# Patient Record
Sex: MALE | Race: BLACK OR AFRICAN AMERICAN | NOT HISPANIC OR LATINO
[De-identification: names, ages, dates, MRNs, and addresses within clinical notes are randomized per-mention and may not be internally consistent; named-entity substitution may affect disease eponyms.]

---

## 2017-02-06 PROBLEM — Z00.00 ENCOUNTER FOR PREVENTIVE HEALTH EXAMINATION: Status: ACTIVE | Noted: 2017-02-06

## 2017-02-07 PROBLEM — Z86.79 HISTORY OF CARDIAC MURMUR: Status: RESOLVED | Noted: 2017-02-07 | Resolved: 2017-02-07

## 2017-02-07 PROBLEM — Z86.79 HISTORY OF HYPERTENSION: Status: RESOLVED | Noted: 2017-02-07 | Resolved: 2017-02-07

## 2017-02-08 ENCOUNTER — APPOINTMENT (OUTPATIENT)
Dept: CARDIOTHORACIC SURGERY | Facility: CLINIC | Age: 72
End: 2017-02-08

## 2017-02-08 VITALS
HEART RATE: 55 BPM | BODY MASS INDEX: 32.15 KG/M2 | TEMPERATURE: 97.9 F | OXYGEN SATURATION: 96 % | SYSTOLIC BLOOD PRESSURE: 146 MMHG | RESPIRATION RATE: 16 BRPM | WEIGHT: 193 LBS | DIASTOLIC BLOOD PRESSURE: 72 MMHG | HEIGHT: 65 IN

## 2017-02-08 DIAGNOSIS — Z86.79 PERSONAL HISTORY OF OTHER DISEASES OF THE CIRCULATORY SYSTEM: ICD-10-CM

## 2017-02-08 DIAGNOSIS — Z83.3 FAMILY HISTORY OF DIABETES MELLITUS: ICD-10-CM

## 2017-02-08 DIAGNOSIS — I35.9 NONRHEUMATIC AORTIC VALVE DISORDER, UNSPECIFIED: ICD-10-CM

## 2017-02-08 DIAGNOSIS — R06.09 OTHER FORMS OF DYSPNEA: ICD-10-CM

## 2017-02-08 DIAGNOSIS — I35.1 NONRHEUMATIC AORTIC (VALVE) INSUFFICIENCY: ICD-10-CM

## 2017-02-08 DIAGNOSIS — I48.91 UNSPECIFIED ATRIAL FIBRILLATION: ICD-10-CM

## 2017-02-08 DIAGNOSIS — I35.0 NONRHEUMATIC AORTIC (VALVE) STENOSIS: ICD-10-CM

## 2017-02-08 DIAGNOSIS — Z86.69 PERSONAL HISTORY OF OTHER DISEASES OF THE NERVOUS SYSTEM AND SENSE ORGANS: ICD-10-CM

## 2017-02-08 DIAGNOSIS — Z78.9 OTHER SPECIFIED HEALTH STATUS: ICD-10-CM

## 2017-02-08 DIAGNOSIS — I50.30 UNSPECIFIED DIASTOLIC (CONGESTIVE) HEART FAILURE: ICD-10-CM

## 2017-02-08 DIAGNOSIS — Z86.39 PERSONAL HISTORY OF OTHER ENDOCRINE, NUTRITIONAL AND METABOLIC DISEASE: ICD-10-CM

## 2017-02-10 PROBLEM — Z86.69 HISTORY OF GLAUCOMA: Status: RESOLVED | Noted: 2017-02-10 | Resolved: 2017-02-10

## 2017-02-10 PROBLEM — I50.30 DIASTOLIC CONGESTIVE HEART FAILURE: Status: ACTIVE | Noted: 2017-02-10

## 2017-02-10 PROBLEM — Z78.9 PREFERRED LANGUAGE IS CREOLE: Status: ACTIVE | Noted: 2017-02-10

## 2017-02-10 PROBLEM — Z86.39 HISTORY OF HYPERLIPIDEMIA: Status: RESOLVED | Noted: 2017-02-10 | Resolved: 2017-02-10

## 2017-02-10 PROBLEM — Z83.3 FAMILY HISTORY OF DIABETES MELLITUS: Status: ACTIVE | Noted: 2017-02-10

## 2017-02-10 PROBLEM — R06.09 DYSPNEA ON EXERTION: Status: ACTIVE | Noted: 2017-02-10

## 2017-02-10 PROBLEM — I35.1 SEVERE AORTIC REGURGITATION: Status: ACTIVE | Noted: 2017-02-07

## 2017-02-10 RX ORDER — AMLODIPINE BESYLATE AND BENAZEPRIL HYDROCHLORIDE 10; 40 MG/1; MG/1
10-40 CAPSULE ORAL
Qty: 90 | Refills: 0 | Status: ACTIVE | COMMUNITY
Start: 2016-01-05

## 2017-02-10 RX ORDER — BRIMONIDINE TARTRATE 2 MG/MG
0.2 SOLUTION/ DROPS OPHTHALMIC
Qty: 5 | Refills: 0 | Status: ACTIVE | COMMUNITY
Start: 2016-10-21

## 2017-02-10 RX ORDER — ASPIRIN ENTERIC COATED TABLETS 81 MG 81 MG/1
81 TABLET, DELAYED RELEASE ORAL DAILY
Qty: 30 | Refills: 3 | Status: ACTIVE | COMMUNITY
Start: 2017-02-10

## 2017-02-10 RX ORDER — LATANOPROST/PF 0.005 %
0.01 DROPS OPHTHALMIC (EYE)
Qty: 2 | Refills: 0 | Status: ACTIVE | COMMUNITY
Start: 2016-10-21

## 2017-02-10 RX ORDER — EPLERENONE 25 MG/1
25 TABLET, COATED ORAL
Qty: 90 | Refills: 0 | Status: DISCONTINUED | COMMUNITY
Start: 2016-01-29 | End: 2017-02-10

## 2017-02-10 RX ORDER — DORZOLAMIDE HYDROCHLORIDE TIMOLOL MALEATE 20; 5 MG/ML; MG/ML
22.3-6.8 SOLUTION/ DROPS OPHTHALMIC
Qty: 10 | Refills: 0 | Status: ACTIVE | COMMUNITY
Start: 2016-10-21

## 2017-02-10 RX ORDER — LEVOFLOXACIN 500 MG/1
500 TABLET, FILM COATED ORAL
Qty: 10 | Refills: 0 | Status: DISCONTINUED | COMMUNITY
Start: 2017-01-10 | End: 2017-02-10

## 2017-02-13 VITALS
DIASTOLIC BLOOD PRESSURE: 67 MMHG | OXYGEN SATURATION: 98 % | HEART RATE: 53 BPM | WEIGHT: 191.8 LBS | SYSTOLIC BLOOD PRESSURE: 140 MMHG | TEMPERATURE: 98 F | HEIGHT: 64.96 IN | RESPIRATION RATE: 16 BRPM

## 2017-02-13 RX ORDER — CHLORHEXIDINE GLUCONATE 213 G/1000ML
1 SOLUTION TOPICAL ONCE
Qty: 0 | Refills: 0 | Status: DISCONTINUED | OUTPATIENT
Start: 2017-02-15 | End: 2017-02-15

## 2017-02-13 NOTE — H&P ADULT. - ASSESSMENT
72 yr old Creole speaking M with PMHx of HTN, who presented to cardiologist with CCS Angina Class III equivalent symptoms, symptomatic severe AS, who now presents for recommended right and left heart catheterization prior to aortic valve surgery with Dr. Jose scheduled for 2/23/17.     Pt is bradycardic in the 50's today and was previously bradycardic per outpt records 01/30/2017. Currently asymptomatic, denies dizziness, fainting, lightheadedness. Will continue to monitor.    ASA 3, Mallampati 3

## 2017-02-13 NOTE — H&P ADULT. - HISTORY OF PRESENT ILLNESS
**SKELETON    72 yr old Creole speaking M with PMHx of HTN who presented to cardiologist c/o progressively worsening CHRISTIAN        Echocardiogram on 6/14/16 revealed normal LV size and systolic function, EF:65-70%. Mild LVH. Severe AS, severe AR, moderate MR. PASP 25-30mmHg.     In light of risk factors, CCS Angina Class III equivalent symptoms, known AS, patient now presents for recommended right and left heart catheterization. **History obtained via daughter- Heather Waddell (reliable)*  **will bring in medications, please confirm**    72 yr old Creole speaking M with PMHx of HTN, who presented to cardiologist c/o progressively worsening CHRISTIAN x 1 year. Patient states he can barely walk >1 flight of stairs prior to becoming winded and developing palpitations. Patient recently has also been experiencing nonradiating midsternal chest tightness with exertion (eg: climbing a flight of stairs, lifting heavy objects), 5/10 in severity, relieved within a few minutes of rest. Patient further reports bilateral LE pitting edema. Patient denies any dizziness, syncope, PND or orthopnea. Prior Echocardiogram on 6/14/16 revealed normal LV size and systolic function, EF:65-70%. Mild LVH. Severe AS, severe AR, moderate MR. PASP 25-30mmHg. In light of risk factors, CCS Angina Class III equivalent symptoms, known AS, patient now presents for recommended right and left heart catheterization prior to aortic valve surgery with Dr. Jose scheduled for 2/23/17. **History obtained via daughter- Heather Waddell (reliable)*    72 yr old Creole speaking M with PMHx of HTN, who presented to cardiologist c/o progressively worsening CHRISTIAN x 1 year. Patient states he can barely walk >1 flight of stairs prior to becoming winded and developing palpitations. Patient recently has also been experiencing nonradiating midsternal chest tightness with exertion (eg: climbing a flight of stairs, lifting heavy objects), 5/10 in severity, relieved within a few minutes of rest. Patient further reports bilateral LE pitting edema. Patient denies any dizziness, syncope, PND or orthopnea. Prior Echocardiogram on 6/14/16 revealed normal LV size and systolic function, EF:65-70%. Mild LVH. Severe AS, severe AR, moderate MR. PASP 25-30mmHg. In light of risk factors, CCS Angina Class III equivalent symptoms, known AS, patient now presents for recommended right and left heart catheterization prior to aortic valve surgery with Dr. Jose scheduled for 2/23/17.

## 2017-02-14 ENCOUNTER — FORM ENCOUNTER (OUTPATIENT)
Age: 72
End: 2017-02-14

## 2017-02-15 ENCOUNTER — OUTPATIENT (OUTPATIENT)
Dept: OUTPATIENT SERVICES | Facility: HOSPITAL | Age: 72
LOS: 1 days | Discharge: NURSING, REHAB OR LONG-TERM CARE | End: 2017-02-15
Payer: COMMERCIAL

## 2017-02-15 ENCOUNTER — OUTPATIENT (OUTPATIENT)
Dept: OUTPATIENT SERVICES | Facility: HOSPITAL | Age: 72
LOS: 1 days | End: 2017-02-15
Payer: COMMERCIAL

## 2017-02-15 DIAGNOSIS — I10 ESSENTIAL (PRIMARY) HYPERTENSION: ICD-10-CM

## 2017-02-15 DIAGNOSIS — Z98.890 OTHER SPECIFIED POSTPROCEDURAL STATES: Chronic | ICD-10-CM

## 2017-02-15 DIAGNOSIS — R07.9 CHEST PAIN, UNSPECIFIED: ICD-10-CM

## 2017-02-15 DIAGNOSIS — I34.0 NONRHEUMATIC MITRAL (VALVE) INSUFFICIENCY: ICD-10-CM

## 2017-02-15 DIAGNOSIS — E78.5 HYPERLIPIDEMIA, UNSPECIFIED: ICD-10-CM

## 2017-02-15 LAB
ALBUMIN SERPL ELPH-MCNC: 3.7 G/DL — SIGNIFICANT CHANGE UP (ref 3.4–5)
ALP SERPL-CCNC: 97 U/L — SIGNIFICANT CHANGE UP (ref 40–120)
ALT FLD-CCNC: 28 U/L — SIGNIFICANT CHANGE UP (ref 12–42)
ANION GAP SERPL CALC-SCNC: 7 MMOL/L — LOW (ref 9–16)
APTT BLD: 34.5 SEC — SIGNIFICANT CHANGE UP (ref 27.5–37.4)
AST SERPL-CCNC: 26 U/L — SIGNIFICANT CHANGE UP (ref 15–37)
BASOPHILS NFR BLD AUTO: 0.2 % — SIGNIFICANT CHANGE UP (ref 0–2)
BILIRUB SERPL-MCNC: 0.5 MG/DL — SIGNIFICANT CHANGE UP (ref 0.2–1.2)
BLD GP AB SCN SERPL QL: NEGATIVE — SIGNIFICANT CHANGE UP
BUN SERPL-MCNC: 10 MG/DL — SIGNIFICANT CHANGE UP (ref 7–23)
CALCIUM SERPL-MCNC: 8.7 MG/DL — SIGNIFICANT CHANGE UP (ref 8.5–10.5)
CHLORIDE SERPL-SCNC: 108 MMOL/L — SIGNIFICANT CHANGE UP (ref 96–108)
CHOLEST SERPL-MCNC: 163 MG/DL — SIGNIFICANT CHANGE UP
CO2 SERPL-SCNC: 28 MMOL/L — SIGNIFICANT CHANGE UP (ref 22–31)
CREAT SERPL-MCNC: 0.66 MG/DL — SIGNIFICANT CHANGE UP (ref 0.5–1.3)
EOSINOPHIL NFR BLD AUTO: 3.6 % — SIGNIFICANT CHANGE UP (ref 0–6)
GLUCOSE SERPL-MCNC: 81 MG/DL — SIGNIFICANT CHANGE UP (ref 70–99)
HBA1C BLD-MCNC: 4.9 % — SIGNIFICANT CHANGE UP (ref 4.8–5.6)
HBV SURFACE AG SER-ACNC: SIGNIFICANT CHANGE UP
HCT VFR BLD CALC: 34.2 % — LOW (ref 39–50)
HDLC SERPL-MCNC: 62 MG/DL — SIGNIFICANT CHANGE UP
HGB BLD-MCNC: 11.2 G/DL — LOW (ref 13–17)
INR BLD: 1.13 — SIGNIFICANT CHANGE UP (ref 0.88–1.16)
LIPID PNL WITH DIRECT LDL SERPL: 91 MG/DL — SIGNIFICANT CHANGE UP
LYMPHOCYTES # BLD AUTO: 54.2 % — HIGH (ref 13–44)
MCHC RBC-ENTMCNC: 24.3 PG — LOW (ref 27–34)
MCHC RBC-ENTMCNC: 32.7 G/DL — SIGNIFICANT CHANGE UP (ref 32–36)
MCV RBC AUTO: 74.2 FL — LOW (ref 80–100)
MONOCYTES NFR BLD AUTO: 3.9 % — SIGNIFICANT CHANGE UP (ref 2–14)
NEUTROPHILS NFR BLD AUTO: 38.1 % — LOW (ref 43–77)
PLATELET # BLD AUTO: 170 K/UL — SIGNIFICANT CHANGE UP (ref 150–400)
POTASSIUM SERPL-MCNC: 3.9 MMOL/L — SIGNIFICANT CHANGE UP (ref 3.5–5.3)
POTASSIUM SERPL-SCNC: 3.9 MMOL/L — SIGNIFICANT CHANGE UP (ref 3.5–5.3)
PROT SERPL-MCNC: 7.9 G/DL — SIGNIFICANT CHANGE UP (ref 6.4–8.2)
PROTHROM AB SERPL-ACNC: 12.6 SEC — SIGNIFICANT CHANGE UP (ref 10–13.1)
RBC # BLD: 4.61 M/UL — SIGNIFICANT CHANGE UP (ref 4.2–5.8)
RBC # FLD: 14.1 % — SIGNIFICANT CHANGE UP (ref 10.3–16.9)
RH IG SCN BLD-IMP: POSITIVE — SIGNIFICANT CHANGE UP
SODIUM SERPL-SCNC: 143 MMOL/L — SIGNIFICANT CHANGE UP (ref 135–145)
TOTAL CHOLESTEROL/HDL RATIO MEASUREMENT: 2.6 RATIO — SIGNIFICANT CHANGE UP
TRIGL SERPL-MCNC: 51 MG/DL — SIGNIFICANT CHANGE UP
TSH SERPL-MCNC: 0.69 UIU/ML — SIGNIFICANT CHANGE UP (ref 0.35–4.94)
WBC # BLD: 4.2 K/UL — SIGNIFICANT CHANGE UP (ref 3.8–10.5)
WBC # FLD AUTO: 4.2 K/UL — SIGNIFICANT CHANGE UP (ref 3.8–10.5)

## 2017-02-15 PROCEDURE — 86901 BLOOD TYPING SEROLOGIC RH(D): CPT

## 2017-02-15 PROCEDURE — C1889: CPT

## 2017-02-15 PROCEDURE — C1769: CPT

## 2017-02-15 PROCEDURE — 87340 HEPATITIS B SURFACE AG IA: CPT

## 2017-02-15 PROCEDURE — 83036 HEMOGLOBIN GLYCOSYLATED A1C: CPT

## 2017-02-15 PROCEDURE — 84443 ASSAY THYROID STIM HORMONE: CPT

## 2017-02-15 PROCEDURE — C1887: CPT

## 2017-02-15 PROCEDURE — 86900 BLOOD TYPING SEROLOGIC ABO: CPT

## 2017-02-15 PROCEDURE — C1894: CPT

## 2017-02-15 PROCEDURE — 80061 LIPID PANEL: CPT

## 2017-02-15 PROCEDURE — 81001 URINALYSIS AUTO W/SCOPE: CPT

## 2017-02-15 PROCEDURE — 93456 R HRT CORONARY ARTERY ANGIO: CPT | Mod: 26

## 2017-02-15 PROCEDURE — 71046 X-RAY EXAM CHEST 2 VIEWS: CPT

## 2017-02-15 PROCEDURE — 81003 URINALYSIS AUTO W/O SCOPE: CPT

## 2017-02-15 PROCEDURE — 71020: CPT | Mod: 26

## 2017-02-15 PROCEDURE — 80053 COMPREHEN METABOLIC PANEL: CPT

## 2017-02-15 PROCEDURE — 85610 PROTHROMBIN TIME: CPT

## 2017-02-15 PROCEDURE — 93456 R HRT CORONARY ARTERY ANGIO: CPT

## 2017-02-15 PROCEDURE — 85730 THROMBOPLASTIN TIME PARTIAL: CPT

## 2017-02-15 PROCEDURE — 93306 TTE W/DOPPLER COMPLETE: CPT

## 2017-02-15 PROCEDURE — 86850 RBC ANTIBODY SCREEN: CPT

## 2017-02-15 PROCEDURE — 85025 COMPLETE CBC W/AUTO DIFF WBC: CPT

## 2017-02-15 RX ORDER — SODIUM CHLORIDE 9 MG/ML
500 INJECTION, SOLUTION INTRAVENOUS
Qty: 0 | Refills: 0 | Status: DISCONTINUED | OUTPATIENT
Start: 2017-02-15 | End: 2017-02-15

## 2017-02-21 VITALS
TEMPERATURE: 98 F | RESPIRATION RATE: 16 BRPM | SYSTOLIC BLOOD PRESSURE: 140 MMHG | HEART RATE: 53 BPM | OXYGEN SATURATION: 98 % | WEIGHT: 192.9 LBS | HEIGHT: 65 IN | DIASTOLIC BLOOD PRESSURE: 67 MMHG

## 2017-02-21 NOTE — H&P ADULT. - HISTORY OF PRESENT ILLNESS
[id number] 72 year old, Creole speaking, with a past medical history of hypertension and hyperlipidemia presents for evaluation and management of valvular disease.     Echocardiogram 6/14/16 revealed: EF 65-70%. LVDD 5.27cm. Severe aortic stenosis. Severe aortic regurgitation. Moderate mitral regurgitation.     Patient reports worsening dyspnea on exertion associated with "chest cramps" with ambulating 3-4 blocks and up one flights of stairs. He denies dizziness, syncope, palpitations, orthopnea or PND. +peripheral edema.     Cardiac catheterization 2/15/17 revealed normal coronaries. Repeat echocardiogram revealed: the left ventricular ejection fraction is estimated to be 65-70%. There is moderate to severe aortic regurgitation. There is severe aortic stenosis. The mean pressure gradient is 52 mmHg. The calculated aortic valve area using the continuity equation is 0.8 cm2. Structurally normal mitral valve. There is trace mitral regurgitation.    The patient was evaluated by CT surgery and is deemed a surgical candidate for aortic valve replacement. The patient agrees to proceed with surgery. [id number]

## 2017-02-21 NOTE — H&P ADULT. - PMH
Aortic regurgitation    Aortic stenosis    Congestive heart failure    Glaucoma    High cholesterol    HTN (hypertension)

## 2017-02-21 NOTE — H&P ADULT. - ASSESSMENT
CHF, AS, AR.     Dr. Jose has reviewed all the patient's medical records and diagnostic studies during his consultation.  He has had a lengthy discussion with the patient regarding his valvular disease and progression.  He has recommended that the patient is a candidate for an aortic valve replacement. He has reviewed all risks, benefits, and alternatives of surgery with the patient including but not limited to stoke, bleeding, infection and death. He has quoted the patient a 1-2% surgical risk.  The patient agrees to proceed with surgery 2/23/17. All preoperative instructions including antibacterial showers x3 reviewed with the patient.

## 2017-02-21 NOTE — H&P ADULT. - FAMILY HISTORY
Mother  Still living? Unknown  Diabetes mellitus, Age at diagnosis: Age Unknown     Sibling  Still living? Unknown  Diabetes mellitus, Age at diagnosis: Age Unknown

## 2017-02-21 NOTE — H&P ADULT. - OTHER
Echocardiogram     There is severe concentric left ventricular hypertrophy. The left ventricular wall motion is normal. The left ventricular ejection fraction is estimated to be 65-70%. The mitral inflow pattern is consistent with impaired left ventricular relaxation with mildly elevated left atrial pressure (8-14mmHg). The left atrial size is normal. Right atrial size is normal. The right ventricle is normal in size and function. There is severe aortic valve thickening. There is moderate to severe aortic regurgitation. There is Severe aortic stenosis. The mean pressure gradient is 52 mmHg. The calculated aortic valve area using the continuity equation is 0.8 cm2. Structurally normal mitral valve. There is trace mitral regurgitation. Structurally normal tricuspid valve. There was insufficient TR detected from which to calculate pulmonary artery systolic pressure. Structurally normal pulmonic valve. No pulmonic regurgitation noted. No aortic root dilatation. The inferior vena cava is normal in size (<2.1 cm) with normal inspiratory collapse (>50%) consistent with normal right atrial pressure. There is no pericardial effusion.

## 2017-02-23 ENCOUNTER — OUTPATIENT (OUTPATIENT)
Dept: OUTPATIENT SERVICES | Facility: HOSPITAL | Age: 72
LOS: 1 days | End: 2017-02-23
Payer: COMMERCIAL

## 2017-02-23 ENCOUNTER — RESULT REVIEW (OUTPATIENT)
Age: 72
End: 2017-02-23

## 2017-02-23 DIAGNOSIS — I35.0 NONRHEUMATIC AORTIC (VALVE) STENOSIS: ICD-10-CM

## 2017-02-23 DIAGNOSIS — Z98.890 OTHER SPECIFIED POSTPROCEDURAL STATES: Chronic | ICD-10-CM

## 2017-02-23 PROCEDURE — 94010 BREATHING CAPACITY TEST: CPT | Mod: 26

## 2017-02-23 PROCEDURE — 94729 DIFFUSING CAPACITY: CPT | Mod: 26

## 2017-02-23 PROCEDURE — 94726 PLETHYSMOGRAPHY LUNG VOLUMES: CPT | Mod: 26

## 2017-02-24 ENCOUNTER — INPATIENT (INPATIENT)
Facility: HOSPITAL | Age: 72
LOS: 7 days | Discharge: HOME HEALTH | DRG: 220 | End: 2017-03-04
Attending: THORACIC SURGERY (CARDIOTHORACIC VASCULAR SURGERY) | Admitting: THORACIC SURGERY (CARDIOTHORACIC VASCULAR SURGERY)
Payer: COMMERCIAL

## 2017-02-24 ENCOUNTER — APPOINTMENT (OUTPATIENT)
Dept: CARDIOTHORACIC SURGERY | Facility: HOSPITAL | Age: 72
End: 2017-02-24

## 2017-02-24 DIAGNOSIS — I35.0 NONRHEUMATIC AORTIC (VALVE) STENOSIS: ICD-10-CM

## 2017-02-24 DIAGNOSIS — Z41.9 ENCOUNTER FOR PROCEDURE FOR PURPOSES OTHER THAN REMEDYING HEALTH STATE, UNSPECIFIED: Chronic | ICD-10-CM

## 2017-02-24 DIAGNOSIS — Z98.890 OTHER SPECIFIED POSTPROCEDURAL STATES: Chronic | ICD-10-CM

## 2017-02-24 LAB
ALBUMIN SERPL ELPH-MCNC: 3.4 G/DL — SIGNIFICANT CHANGE UP (ref 3.4–5)
ALP SERPL-CCNC: 60 U/L — SIGNIFICANT CHANGE UP (ref 40–120)
ALT FLD-CCNC: 16 U/L — SIGNIFICANT CHANGE UP (ref 12–42)
ANION GAP SERPL CALC-SCNC: 5 MMOL/L — LOW (ref 9–16)
ANION GAP SERPL CALC-SCNC: 6 MMOL/L — LOW (ref 9–16)
ANION GAP SERPL CALC-SCNC: 7 MMOL/L — LOW (ref 9–16)
ANION GAP SERPL CALC-SCNC: 8 MMOL/L — LOW (ref 9–16)
APTT BLD: 32.6 SEC — SIGNIFICANT CHANGE UP (ref 27.5–37.4)
APTT BLD: 34.8 SEC — SIGNIFICANT CHANGE UP (ref 27.5–37.4)
APTT BLD: 38 SEC — HIGH (ref 27.5–37.4)
AST SERPL-CCNC: 24 U/L — SIGNIFICANT CHANGE UP (ref 15–37)
BASE EXCESS BLDA CALC-SCNC: -0.3 MMOL/L — SIGNIFICANT CHANGE UP (ref -2–3)
BASE EXCESS BLDA CALC-SCNC: -0.6 MMOL/L — SIGNIFICANT CHANGE UP (ref -2–3)
BASE EXCESS BLDA CALC-SCNC: -0.8 MMOL/L — SIGNIFICANT CHANGE UP (ref -2–3)
BASE EXCESS BLDA CALC-SCNC: -1.3 MMOL/L — SIGNIFICANT CHANGE UP (ref -2–3)
BASE EXCESS BLDA CALC-SCNC: 0.3 MMOL/L — SIGNIFICANT CHANGE UP (ref -2–3)
BASE EXCESS BLDA CALC-SCNC: 0.9 MMOL/L — SIGNIFICANT CHANGE UP (ref -2–3)
BILIRUB SERPL-MCNC: 0.9 MG/DL — SIGNIFICANT CHANGE UP (ref 0.2–1.2)
BUN SERPL-MCNC: 10 MG/DL — SIGNIFICANT CHANGE UP (ref 7–23)
BUN SERPL-MCNC: 10 MG/DL — SIGNIFICANT CHANGE UP (ref 7–23)
BUN SERPL-MCNC: 13 MG/DL — SIGNIFICANT CHANGE UP (ref 7–23)
BUN SERPL-MCNC: 9 MG/DL — SIGNIFICANT CHANGE UP (ref 7–23)
CA-I BLDA-SCNC: 1.16 MMOL/L — SIGNIFICANT CHANGE UP (ref 1.1–1.3)
CALCIUM SERPL-MCNC: 7.4 MG/DL — LOW (ref 8.5–10.5)
CALCIUM SERPL-MCNC: 7.9 MG/DL — LOW (ref 8.5–10.5)
CALCIUM SERPL-MCNC: 8.3 MG/DL — LOW (ref 8.5–10.5)
CALCIUM SERPL-MCNC: 8.6 MG/DL — SIGNIFICANT CHANGE UP (ref 8.5–10.5)
CHLORIDE SERPL-SCNC: 104 MMOL/L — SIGNIFICANT CHANGE UP (ref 96–108)
CHLORIDE SERPL-SCNC: 107 MMOL/L — SIGNIFICANT CHANGE UP (ref 96–108)
CHLORIDE SERPL-SCNC: 109 MMOL/L — HIGH (ref 96–108)
CHLORIDE SERPL-SCNC: 110 MMOL/L — HIGH (ref 96–108)
CO2 SERPL-SCNC: 26 MMOL/L — SIGNIFICANT CHANGE UP (ref 22–31)
CO2 SERPL-SCNC: 26 MMOL/L — SIGNIFICANT CHANGE UP (ref 22–31)
CO2 SERPL-SCNC: 27 MMOL/L — SIGNIFICANT CHANGE UP (ref 22–31)
CO2 SERPL-SCNC: 28 MMOL/L — SIGNIFICANT CHANGE UP (ref 22–31)
COHGB MFR BLDA: 2.1 % — HIGH
CREAT SERPL-MCNC: 0.66 MG/DL — SIGNIFICANT CHANGE UP (ref 0.5–1.3)
CREAT SERPL-MCNC: 0.76 MG/DL — SIGNIFICANT CHANGE UP (ref 0.5–1.3)
CREAT SERPL-MCNC: 0.8 MG/DL — SIGNIFICANT CHANGE UP (ref 0.5–1.3)
CREAT SERPL-MCNC: 0.8 MG/DL — SIGNIFICANT CHANGE UP (ref 0.5–1.3)
GAS PNL BLDA: SIGNIFICANT CHANGE UP
GLUCOSE SERPL-MCNC: 142 MG/DL — HIGH (ref 70–99)
GLUCOSE SERPL-MCNC: 155 MG/DL — HIGH (ref 70–99)
GLUCOSE SERPL-MCNC: 157 MG/DL — HIGH (ref 70–99)
GLUCOSE SERPL-MCNC: 99 MG/DL — SIGNIFICANT CHANGE UP (ref 70–99)
HCO3 BLDA-SCNC: 22 MMOL/L — SIGNIFICANT CHANGE UP (ref 21–28)
HCO3 BLDA-SCNC: 24 MMOL/L — SIGNIFICANT CHANGE UP (ref 21–28)
HCO3 BLDA-SCNC: 25 MMOL/L — SIGNIFICANT CHANGE UP (ref 21–28)
HCO3 BLDA-SCNC: 26 MMOL/L — SIGNIFICANT CHANGE UP (ref 21–28)
HCO3 BLDA-SCNC: 26 MMOL/L — SIGNIFICANT CHANGE UP (ref 21–28)
HCO3 BLDA-SCNC: 27 MMOL/L — SIGNIFICANT CHANGE UP (ref 21–28)
HCT VFR BLD CALC: 26.4 % — LOW (ref 39–50)
HCT VFR BLD CALC: 26.5 % — LOW (ref 39–50)
HCT VFR BLD CALC: 27.2 % — LOW (ref 39–50)
HCT VFR BLD CALC: 29.1 % — LOW (ref 39–50)
HGB BLD-MCNC: 8.3 G/DL — LOW (ref 13–17)
HGB BLD-MCNC: 8.6 G/DL — LOW (ref 13–17)
HGB BLD-MCNC: 9 G/DL — LOW (ref 13–17)
HGB BLD-MCNC: 9.6 G/DL — LOW (ref 13–17)
HGB BLDA-MCNC: 11 G/DL — LOW (ref 13–17)
INR BLD: 1.28 — HIGH (ref 0.88–1.16)
INR BLD: 1.33 — HIGH (ref 0.88–1.16)
INR BLD: 1.38 — HIGH (ref 0.88–1.16)
LACTATE SERPL-SCNC: 1.5 MMOL/L — SIGNIFICANT CHANGE UP (ref 0.5–2)
LACTATE SERPL-SCNC: 1.9 MMOL/L — SIGNIFICANT CHANGE UP (ref 0.5–2)
LACTATE SERPL-SCNC: 2.1 MMOL/L — HIGH (ref 0.5–2)
LACTATE SERPL-SCNC: 2.8 MMOL/L — HIGH (ref 0.5–2)
LYMPHOCYTES # BLD AUTO: 22 % — SIGNIFICANT CHANGE UP (ref 13–44)
MAGNESIUM SERPL-MCNC: 2.3 MG/DL — SIGNIFICANT CHANGE UP (ref 1.6–2.4)
MAGNESIUM SERPL-MCNC: 2.3 MG/DL — SIGNIFICANT CHANGE UP (ref 1.6–2.4)
MAGNESIUM SERPL-MCNC: 2.8 MG/DL — HIGH (ref 1.6–2.4)
MCHC RBC-ENTMCNC: 23.6 PG — LOW (ref 27–34)
MCHC RBC-ENTMCNC: 24 PG — LOW (ref 27–34)
MCHC RBC-ENTMCNC: 24.4 PG — LOW (ref 27–34)
MCHC RBC-ENTMCNC: 24.7 PG — LOW (ref 27–34)
MCHC RBC-ENTMCNC: 31.4 G/DL — LOW (ref 32–36)
MCHC RBC-ENTMCNC: 32.5 G/DL — SIGNIFICANT CHANGE UP (ref 32–36)
MCHC RBC-ENTMCNC: 33 G/DL — SIGNIFICANT CHANGE UP (ref 32–36)
MCHC RBC-ENTMCNC: 33.1 G/DL — SIGNIFICANT CHANGE UP (ref 32–36)
MCV RBC AUTO: 73.9 FL — LOW (ref 80–100)
MCV RBC AUTO: 74 FL — LOW (ref 80–100)
MCV RBC AUTO: 74.5 FL — LOW (ref 80–100)
MCV RBC AUTO: 75 FL — LOW (ref 80–100)
METHGB MFR BLDA: 0.3 % — SIGNIFICANT CHANGE UP
MONOCYTES NFR BLD AUTO: 3 % — SIGNIFICANT CHANGE UP (ref 2–14)
NEUTROPHILS NFR BLD AUTO: 66 % — SIGNIFICANT CHANGE UP (ref 43–77)
O2 CT VFR BLDA CALC: 16.2 ML/DL — SIGNIFICANT CHANGE UP (ref 15–23)
OXYHGB MFR BLDA: 97 % — SIGNIFICANT CHANGE UP (ref 94–100)
PCO2 BLDA: 31 MMHG — LOW (ref 35–48)
PCO2 BLDA: 38 MMHG — SIGNIFICANT CHANGE UP (ref 35–48)
PCO2 BLDA: 44 MMHG — SIGNIFICANT CHANGE UP (ref 35–48)
PCO2 BLDA: 45 MMHG — SIGNIFICANT CHANGE UP (ref 35–48)
PCO2 BLDA: 48 MMHG — SIGNIFICANT CHANGE UP (ref 35–48)
PCO2 BLDA: 52 MMHG — HIGH (ref 35–48)
PH BLDA: 7.32 — LOW (ref 7.35–7.45)
PH BLDA: 7.35 — SIGNIFICANT CHANGE UP (ref 7.35–7.45)
PH BLDA: 7.36 — SIGNIFICANT CHANGE UP (ref 7.35–7.45)
PH BLDA: 7.39 — SIGNIFICANT CHANGE UP (ref 7.35–7.45)
PH BLDA: 7.41 — SIGNIFICANT CHANGE UP (ref 7.35–7.45)
PH BLDA: 7.48 — HIGH (ref 7.35–7.45)
PHOSPHATE SERPL-MCNC: 3.1 MG/DL — SIGNIFICANT CHANGE UP (ref 2.5–4.5)
PHOSPHATE SERPL-MCNC: 4.2 MG/DL — SIGNIFICANT CHANGE UP (ref 2.5–4.5)
PLATELET # BLD AUTO: 102 K/UL — LOW (ref 150–400)
PLATELET # BLD AUTO: 104 K/UL — LOW (ref 150–400)
PLATELET # BLD AUTO: 107 K/UL — LOW (ref 150–400)
PLATELET # BLD AUTO: 112 K/UL — LOW (ref 150–400)
PO2 BLDA: 164 MMHG — HIGH (ref 83–108)
PO2 BLDA: 165 MMHG — HIGH (ref 83–108)
PO2 BLDA: 165 MMHG — HIGH (ref 83–108)
PO2 BLDA: 167 MMHG — HIGH (ref 83–108)
PO2 BLDA: 343 MMHG — HIGH (ref 83–108)
PO2 BLDA: 423 MMHG — HIGH (ref 83–108)
POTASSIUM BLDA-SCNC: 3.8 MMOL/L — SIGNIFICANT CHANGE UP (ref 3.5–4.9)
POTASSIUM SERPL-MCNC: 3.6 MMOL/L — SIGNIFICANT CHANGE UP (ref 3.5–5.3)
POTASSIUM SERPL-MCNC: 4.1 MMOL/L — SIGNIFICANT CHANGE UP (ref 3.5–5.3)
POTASSIUM SERPL-MCNC: 4.2 MMOL/L — SIGNIFICANT CHANGE UP (ref 3.5–5.3)
POTASSIUM SERPL-MCNC: 4.2 MMOL/L — SIGNIFICANT CHANGE UP (ref 3.5–5.3)
POTASSIUM SERPL-SCNC: 3.6 MMOL/L — SIGNIFICANT CHANGE UP (ref 3.5–5.3)
POTASSIUM SERPL-SCNC: 4.1 MMOL/L — SIGNIFICANT CHANGE UP (ref 3.5–5.3)
POTASSIUM SERPL-SCNC: 4.2 MMOL/L — SIGNIFICANT CHANGE UP (ref 3.5–5.3)
POTASSIUM SERPL-SCNC: 4.2 MMOL/L — SIGNIFICANT CHANGE UP (ref 3.5–5.3)
PROT SERPL-MCNC: 6 G/DL — LOW (ref 6.4–8.2)
PROTHROM AB SERPL-ACNC: 14.2 SEC — HIGH (ref 10–13.1)
PROTHROM AB SERPL-ACNC: 14.8 SEC — HIGH (ref 10–13.1)
PROTHROM AB SERPL-ACNC: 15.4 SEC — HIGH (ref 10–13.1)
RBC # BLD: 3.52 M/UL — LOW (ref 4.2–5.8)
RBC # BLD: 3.58 M/UL — LOW (ref 4.2–5.8)
RBC # BLD: 3.65 M/UL — LOW (ref 4.2–5.8)
RBC # BLD: 3.94 M/UL — LOW (ref 4.2–5.8)
RBC # FLD: 13.7 % — SIGNIFICANT CHANGE UP (ref 10.3–16.9)
RBC # FLD: 13.8 % — SIGNIFICANT CHANGE UP (ref 10.3–16.9)
RBC # FLD: 14.2 % — SIGNIFICANT CHANGE UP (ref 10.3–16.9)
RBC # FLD: 14.2 % — SIGNIFICANT CHANGE UP (ref 10.3–16.9)
SAO2 % BLDA: 100 % — SIGNIFICANT CHANGE UP (ref 95–100)
SAO2 % BLDA: 100 % — SIGNIFICANT CHANGE UP (ref 95–100)
SAO2 % BLDA: 99 % — SIGNIFICANT CHANGE UP (ref 95–100)
SODIUM BLDA-SCNC: 138 MMOL/L — SIGNIFICANT CHANGE UP (ref 138–146)
SODIUM SERPL-SCNC: 138 MMOL/L — SIGNIFICANT CHANGE UP (ref 135–145)
SODIUM SERPL-SCNC: 140 MMOL/L — SIGNIFICANT CHANGE UP (ref 135–145)
SODIUM SERPL-SCNC: 142 MMOL/L — SIGNIFICANT CHANGE UP (ref 135–145)
SODIUM SERPL-SCNC: 143 MMOL/L — SIGNIFICANT CHANGE UP (ref 135–145)
WBC # BLD: 13.3 K/UL — HIGH (ref 3.8–10.5)
WBC # BLD: 14.5 K/UL — HIGH (ref 3.8–10.5)
WBC # BLD: 8.5 K/UL — SIGNIFICANT CHANGE UP (ref 3.8–10.5)
WBC # BLD: 9.8 K/UL — SIGNIFICANT CHANGE UP (ref 3.8–10.5)
WBC # FLD AUTO: 13.3 K/UL — HIGH (ref 3.8–10.5)
WBC # FLD AUTO: 14.5 K/UL — HIGH (ref 3.8–10.5)
WBC # FLD AUTO: 8.5 K/UL — SIGNIFICANT CHANGE UP (ref 3.8–10.5)
WBC # FLD AUTO: 9.8 K/UL — SIGNIFICANT CHANGE UP (ref 3.8–10.5)

## 2017-02-24 PROCEDURE — 94726 PLETHYSMOGRAPHY LUNG VOLUMES: CPT

## 2017-02-24 PROCEDURE — 94060 EVALUATION OF WHEEZING: CPT

## 2017-02-24 PROCEDURE — 94729 DIFFUSING CAPACITY: CPT

## 2017-02-24 PROCEDURE — 99291 CRITICAL CARE FIRST HOUR: CPT

## 2017-02-24 PROCEDURE — C1889: CPT

## 2017-02-24 PROCEDURE — 33405 REPLACEMENT AORTIC VALVE OPN: CPT | Mod: AS

## 2017-02-24 PROCEDURE — 94760 N-INVAS EAR/PLS OXIMETRY 1: CPT

## 2017-02-24 PROCEDURE — 99292 CRITICAL CARE ADDL 30 MIN: CPT

## 2017-02-24 PROCEDURE — 71010: CPT | Mod: 26

## 2017-02-24 PROCEDURE — 93010 ELECTROCARDIOGRAM REPORT: CPT

## 2017-02-24 RX ORDER — ASPIRIN/CALCIUM CARB/MAGNESIUM 324 MG
1 TABLET ORAL
Qty: 0 | Refills: 0 | COMMUNITY

## 2017-02-24 RX ORDER — ALBUMIN HUMAN 25 %
250 VIAL (ML) INTRAVENOUS ONCE
Qty: 0 | Refills: 0 | Status: COMPLETED | OUTPATIENT
Start: 2017-02-24 | End: 2017-02-24

## 2017-02-24 RX ORDER — MEPERIDINE HYDROCHLORIDE 50 MG/ML
12.5 INJECTION INTRAMUSCULAR; INTRAVENOUS; SUBCUTANEOUS ONCE
Qty: 0 | Refills: 0 | Status: DISCONTINUED | OUTPATIENT
Start: 2017-02-24 | End: 2017-02-24

## 2017-02-24 RX ORDER — POTASSIUM CHLORIDE 20 MEQ
20 PACKET (EA) ORAL ONCE
Qty: 0 | Refills: 0 | Status: COMPLETED | OUTPATIENT
Start: 2017-02-24 | End: 2017-02-24

## 2017-02-24 RX ORDER — BRIMONIDINE TARTRATE 2 MG/MG
1 SOLUTION/ DROPS OPHTHALMIC
Qty: 0 | Refills: 0 | COMMUNITY

## 2017-02-24 RX ORDER — CLEVIDIPINE BUTYRATE 50MG/100ML
2 VIAL (ML) INTRAVENOUS
Qty: 25 | Refills: 0 | Status: DISCONTINUED | OUTPATIENT
Start: 2017-02-24 | End: 2017-02-25

## 2017-02-24 RX ORDER — ACETAMINOPHEN 500 MG
1000 TABLET ORAL ONCE
Qty: 0 | Refills: 0 | Status: COMPLETED | OUTPATIENT
Start: 2017-02-24 | End: 2017-02-24

## 2017-02-24 RX ORDER — INSULIN HUMAN 100 [IU]/ML
1 INJECTION, SOLUTION SUBCUTANEOUS
Qty: 250 | Refills: 0 | Status: DISCONTINUED | OUTPATIENT
Start: 2017-02-24 | End: 2017-02-25

## 2017-02-24 RX ORDER — CEFAZOLIN SODIUM 1 G
1000 VIAL (EA) INJECTION EVERY 8 HOURS
Qty: 0 | Refills: 0 | Status: COMPLETED | OUTPATIENT
Start: 2017-02-24 | End: 2017-02-26

## 2017-02-24 RX ORDER — PHENYLEPHRINE HYDROCHLORIDE 10 MG/ML
0.2 INJECTION INTRAVENOUS
Qty: 80 | Refills: 0 | Status: DISCONTINUED | OUTPATIENT
Start: 2017-02-24 | End: 2017-02-25

## 2017-02-24 RX ORDER — CEFAZOLIN SODIUM 1 G
1000 VIAL (EA) INJECTION EVERY 8 HOURS
Qty: 0 | Refills: 0 | Status: DISCONTINUED | OUTPATIENT
Start: 2017-02-24 | End: 2017-02-24

## 2017-02-24 RX ORDER — METOPROLOL TARTRATE 50 MG
2.5 TABLET ORAL ONCE
Qty: 0 | Refills: 0 | Status: COMPLETED | OUTPATIENT
Start: 2017-02-24 | End: 2017-02-24

## 2017-02-24 RX ORDER — FENTANYL CITRATE 50 UG/ML
12.5 INJECTION INTRAVENOUS ONCE
Qty: 0 | Refills: 0 | Status: DISCONTINUED | OUTPATIENT
Start: 2017-02-24 | End: 2017-02-24

## 2017-02-24 RX ORDER — FAMOTIDINE 10 MG/ML
20 INJECTION INTRAVENOUS DAILY
Qty: 0 | Refills: 0 | Status: DISCONTINUED | OUTPATIENT
Start: 2017-02-24 | End: 2017-02-25

## 2017-02-24 RX ORDER — LATANOPROST 0.05 MG/ML
1 SOLUTION/ DROPS OPHTHALMIC; TOPICAL
Qty: 0 | Refills: 0 | COMMUNITY

## 2017-02-24 RX ORDER — DORZOLAMIDE HYDROCHLORIDE TIMOLOL MALEATE 20; 5 MG/ML; MG/ML
1 SOLUTION/ DROPS OPHTHALMIC
Qty: 0 | Refills: 0 | COMMUNITY

## 2017-02-24 RX ORDER — PROPOFOL 10 MG/ML
5 INJECTION, EMULSION INTRAVENOUS
Qty: 1000 | Refills: 0 | Status: DISCONTINUED | OUTPATIENT
Start: 2017-02-24 | End: 2017-02-25

## 2017-02-24 RX ORDER — KETOROLAC TROMETHAMINE 30 MG/ML
30 SYRINGE (ML) INJECTION ONCE
Qty: 0 | Refills: 0 | Status: DISCONTINUED | OUTPATIENT
Start: 2017-02-24 | End: 2017-02-25

## 2017-02-24 RX ORDER — ONDANSETRON 8 MG/1
4 TABLET, FILM COATED ORAL ONCE
Qty: 0 | Refills: 0 | Status: COMPLETED | OUTPATIENT
Start: 2017-02-24 | End: 2017-02-24

## 2017-02-24 RX ORDER — SODIUM CHLORIDE 9 MG/ML
1000 INJECTION, SOLUTION INTRAVENOUS
Qty: 0 | Refills: 0 | Status: DISCONTINUED | OUTPATIENT
Start: 2017-02-24 | End: 2017-03-01

## 2017-02-24 RX ORDER — EPLERENONE 50 MG/1
1 TABLET, FILM COATED ORAL
Qty: 0 | Refills: 0 | COMMUNITY

## 2017-02-24 RX ORDER — CHLORHEXIDINE GLUCONATE 213 G/1000ML
5 SOLUTION TOPICAL EVERY 4 HOURS
Qty: 0 | Refills: 0 | Status: DISCONTINUED | OUTPATIENT
Start: 2017-02-24 | End: 2017-02-25

## 2017-02-24 RX ADMIN — ONDANSETRON 4 MILLIGRAM(S): 8 TABLET, FILM COATED ORAL at 18:49

## 2017-02-24 RX ADMIN — MEPERIDINE HYDROCHLORIDE 12.5 MILLIGRAM(S): 50 INJECTION INTRAMUSCULAR; INTRAVENOUS; SUBCUTANEOUS at 15:30

## 2017-02-24 RX ADMIN — Medication 100 MILLIEQUIVALENT(S): at 18:53

## 2017-02-24 RX ADMIN — SODIUM CHLORIDE 10 MILLILITER(S): 9 INJECTION, SOLUTION INTRAVENOUS at 20:08

## 2017-02-24 RX ADMIN — Medication 4 MG/HR: at 16:16

## 2017-02-24 RX ADMIN — Medication 125 MILLILITER(S): at 18:52

## 2017-02-24 RX ADMIN — INSULIN HUMAN 1 UNIT(S)/HR: 100 INJECTION, SOLUTION SUBCUTANEOUS at 20:05

## 2017-02-24 RX ADMIN — PROPOFOL 2.62 MICROGRAM(S)/KG/MIN: 10 INJECTION, EMULSION INTRAVENOUS at 14:20

## 2017-02-24 RX ADMIN — FENTANYL CITRATE 12.5 MICROGRAM(S): 50 INJECTION INTRAVENOUS at 17:30

## 2017-02-24 RX ADMIN — Medication 125 MILLILITER(S): at 20:03

## 2017-02-24 RX ADMIN — FAMOTIDINE 20 MILLIGRAM(S): 10 INJECTION INTRAVENOUS at 18:58

## 2017-02-24 RX ADMIN — Medication 100 MILLIGRAM(S): at 18:30

## 2017-02-24 RX ADMIN — Medication 400 MILLIGRAM(S): at 18:49

## 2017-02-24 RX ADMIN — Medication 1000 MILLIGRAM(S): at 18:53

## 2017-02-24 RX ADMIN — PROPOFOL 2.62 MICROGRAM(S)/KG/MIN: 10 INJECTION, EMULSION INTRAVENOUS at 14:30

## 2017-02-24 RX ADMIN — FENTANYL CITRATE 12.5 MICROGRAM(S): 50 INJECTION INTRAVENOUS at 16:55

## 2017-02-24 RX ADMIN — Medication 125 MILLILITER(S): at 18:51

## 2017-02-24 RX ADMIN — Medication 4 MG/HR: at 16:30

## 2017-02-24 RX ADMIN — MEPERIDINE HYDROCHLORIDE 12.5 MILLIGRAM(S): 50 INJECTION INTRAMUSCULAR; INTRAVENOUS; SUBCUTANEOUS at 16:45

## 2017-02-24 RX ADMIN — Medication 100 MILLIEQUIVALENT(S): at 18:00

## 2017-02-24 RX ADMIN — Medication 125 MILLILITER(S): at 18:50
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
## 2017-02-24 NOTE — PROGRESS NOTE ADULT - ASSESSMENT
73yo with severe AS/AI s/p mini- AVR.  uncomplicated case.  Arrived on low dose Epi, received FFP in the OR.  Pt sinus bradycardic but needed intermittent pacing.     Problems  1. S/p AVR  2. Post op resp failure  3. Hemodynamic instability  4. sinus bradycardia    Plan  Neuro - no deficit  CVS - s/p AVR, hyperdynamic LV.  Labile BP, fluid resuscitation  Sinus Clement with good BP.   change Epi for mathieu  Pulm - vent weaning  ID periop antibiotics  Endo - glycemic control   Heme - monitor for bleeding.     Critical care time spent 45 min

## 2017-02-24 NOTE — PROGRESS NOTE ADULT - SUBJECTIVE AND OBJECTIVE BOX
Date of surgery : 2/27/2017    Surgeon : Damon     Anesthesia : Lee Ann    Procedure : mini AVR    Pump Time :              84               Aortic X -Clamp :         66                     EF :     Pre OP :           nl                 Post OP :              nl (LVH)                               Airway :      Difficult Airway :                   [ ] Yes     [x ] No      ETT             Size :      8         Lip Line :       21           Ventilator setting :              [x ] ON          [ x] BS +           [ x] ETCO2       Mode: AC/ CMV (Assist Control/ Continuous Mandatory Ventilation)  RR (machine): 12  TV (machine): 550  FiO2: 100  PEEP: 5  ITime: 1  MAP: 9  PIP: 23          Lines :      [ ] PA catheter      [ x] Radial Arterial Line              [  x] Right              [  ] Left       [ ] Femoral Arterial Line          [  ] Right              [  ] Left      [ x] Central Line   IJ                     [  x] Right              [  ] Left      [ ] Femoral Central line            [  ] Right              [  ] Left     Tubes :      Blakes :                                      [ x ] Med.             [  ] Pleural      Chest Tubes :                           [  x] Med.             [  ] Pleural       Pacing     Wires :             [   ] Atrial                  [ x  ]  Venticular      Pacer Setting :      VVI @ 40        Threshold  :                Sensitivity :       Intake     Drips :   Epi @0.03     IVF : 2000     Albumin :     Product :            [   ]  PRBC       [  ] Platelet     [  2 ] FFP          [   ] Cryoprecipitate                                  [   ]  Cell saver                                  [   ]  Hemostatic agent :                                  [   ]   Factors :       Output      EBL : 250     Urine : 1500      Antibiotics :   ICU Vital Signs Last 24 Hrs  HR: 62 (54 - 82)  BP: 139/72 (113/58 - 139/72)  BP(mean): 97 (79 - 97)  ABP: 156/68 (110/60 - 156/68)  ABP(mean): 92 (76 - 100)  RR: 17 (12 - 19)  SpO2: 100% (100% - 100%)    I&O's Summary    I & Os for current day (as of 24 Feb 2017 16:18)  =============================================  IN: 0 ml / OUT: 1736 ml / NET: -1736 ml    ABG - ( 24 Feb 2017 16:09 )  pH: 7.35  /  pCO2: 45    /  pO2: 167   / HCO3: 25    / Base Excess: -1.3  /  SaO2: 99                              8.3    13.3  )-----------( 102      ( 24 Feb 2017 15:18 )             26.4     24 Feb 2017 15:18    142    |  109    |  10     ----------------------------<  157    4.2     |  26     |  0.76     Ca    7.9        24 Feb 2017 15:18  Phos  3.1       24 Feb 2017 15:18  Mg     2.3       24 Feb 2017 15:18    TPro  6.0    /  Alb  3.4    /  TBili  0.9    /  DBili  x      /  AST  24     /  ALT  16     /  AlkPhos  60     24 Feb 2017 15:18    PT/INR - ( 24 Feb 2017 15:18 )   PT: 15.4 sec;   INR: 1.38          PTT - ( 24 Feb 2017 15:18 )  PTT:38.0 sec  MEDICATIONS  (STANDING):  sodium chloride 0.45%. 1000milliLiter(s) IV Continuous <Continuous>  ceFAZolin   IVPB 1000milliGRAM(s) IV Intermittent every 8 hours  chlorhexidine 0.12% Liquid 5milliLiter(s) Swish and Spit every 4 hours  famotidine Injectable 20milliGRAM(s) IV Push daily  propofol Infusion 5MICROgram(s)/kG/Min IV Continuous <Continuous>  insulin Infusion 1Unit(s)/Hr IV Continuous <Continuous>  meperidine     Injectable 12.5milliGRAM(s) IV Push once  metoprolol Injectable 2.5milliGRAM(s) IV Push once  albumin human  5% IVPB 250milliLiter(s) IV Intermittent once  albumin human  5% IVPB 250milliLiter(s) IV Intermittent once  albumin human  5% IVPB 250milliLiter(s) IV Intermittent once  fentaNYL    Injectable 12.5MICROGram(s) IV Push once  clevidipine Infusion 2mG/Hr IV Continuous <Continuous>      PHYSICAL EXAM:  Gen : no acute distress  Respiratory: decreased in the bases  Cardiovascular: S1 and S2, RRR, no M/G/R  Gastrointestinal: BS+, soft, NT/ND  Extremities: No peripheral edema  Vascular: 2+ peripheral pulses  Neurological: no focal deficits  Incision: clean dry/ no sign of infection  Lines: no sign of infection

## 2017-02-25 LAB
ALBUMIN SERPL ELPH-MCNC: 3.6 G/DL — SIGNIFICANT CHANGE UP (ref 3.4–5)
ALP SERPL-CCNC: 54 U/L — SIGNIFICANT CHANGE UP (ref 40–120)
ALT FLD-CCNC: 17 U/L — SIGNIFICANT CHANGE UP (ref 12–42)
ANION GAP SERPL CALC-SCNC: 6 MMOL/L — LOW (ref 9–16)
ANION GAP SERPL CALC-SCNC: 8 MMOL/L — LOW (ref 9–16)
ANION GAP SERPL CALC-SCNC: 8 MMOL/L — LOW (ref 9–16)
APTT BLD: 30 SEC — SIGNIFICANT CHANGE UP (ref 27.5–37.4)
APTT BLD: >200 SEC — CRITICAL HIGH (ref 27.5–37.4)
AST SERPL-CCNC: 37 U/L — SIGNIFICANT CHANGE UP (ref 15–37)
BILIRUB DIRECT SERPL-MCNC: 0.28 MG/DL — HIGH
BILIRUB INDIRECT FLD-MCNC: 0.8 MG/DL — SIGNIFICANT CHANGE UP (ref 0.2–1)
BILIRUB SERPL-MCNC: 1.1 MG/DL — SIGNIFICANT CHANGE UP (ref 0.2–1.2)
BUN SERPL-MCNC: 15 MG/DL — SIGNIFICANT CHANGE UP (ref 7–23)
BUN SERPL-MCNC: 18 MG/DL — SIGNIFICANT CHANGE UP (ref 7–23)
BUN SERPL-MCNC: 18 MG/DL — SIGNIFICANT CHANGE UP (ref 7–23)
CALCIUM SERPL-MCNC: 8 MG/DL — LOW (ref 8.5–10.5)
CALCIUM SERPL-MCNC: 8.1 MG/DL — LOW (ref 8.5–10.5)
CALCIUM SERPL-MCNC: 8.4 MG/DL — LOW (ref 8.5–10.5)
CHLORIDE SERPL-SCNC: 106 MMOL/L — SIGNIFICANT CHANGE UP (ref 96–108)
CHLORIDE SERPL-SCNC: 107 MMOL/L — SIGNIFICANT CHANGE UP (ref 96–108)
CHLORIDE SERPL-SCNC: 109 MMOL/L — HIGH (ref 96–108)
CO2 SERPL-SCNC: 24 MMOL/L — SIGNIFICANT CHANGE UP (ref 22–31)
CO2 SERPL-SCNC: 27 MMOL/L — SIGNIFICANT CHANGE UP (ref 22–31)
CO2 SERPL-SCNC: 27 MMOL/L — SIGNIFICANT CHANGE UP (ref 22–31)
CREAT SERPL-MCNC: 0.74 MG/DL — SIGNIFICANT CHANGE UP (ref 0.5–1.3)
CREAT SERPL-MCNC: 0.74 MG/DL — SIGNIFICANT CHANGE UP (ref 0.5–1.3)
CREAT SERPL-MCNC: 0.88 MG/DL — SIGNIFICANT CHANGE UP (ref 0.5–1.3)
GAS PNL BLDA: SIGNIFICANT CHANGE UP
GLUCOSE SERPL-MCNC: 123 MG/DL — HIGH (ref 70–99)
GLUCOSE SERPL-MCNC: 156 MG/DL — HIGH (ref 70–99)
GLUCOSE SERPL-MCNC: 200 MG/DL — HIGH (ref 70–99)
HCT VFR BLD CALC: 26.9 % — LOW (ref 39–50)
HCT VFR BLD CALC: 27.4 % — LOW (ref 39–50)
HCT VFR BLD CALC: 29 % — LOW (ref 39–50)
HGB BLD-MCNC: 8.9 G/DL — LOW (ref 13–17)
HGB BLD-MCNC: 9 G/DL — LOW (ref 13–17)
HGB BLD-MCNC: 9.6 G/DL — LOW (ref 13–17)
INR BLD: 1.33 — HIGH (ref 0.88–1.16)
INR BLD: 1.45 — HIGH (ref 0.88–1.16)
INR BLD: >24 — SIGNIFICANT CHANGE UP (ref 0.88–1.16)
LACTATE SERPL-SCNC: 1.1 MMOL/L — SIGNIFICANT CHANGE UP (ref 0.5–2)
LACTATE SERPL-SCNC: 2.5 MMOL/L — HIGH (ref 0.5–2)
MAGNESIUM SERPL-MCNC: 2.1 MG/DL — SIGNIFICANT CHANGE UP (ref 1.6–2.4)
MAGNESIUM SERPL-MCNC: 2.2 MG/DL — SIGNIFICANT CHANGE UP (ref 1.6–2.4)
MCHC RBC-ENTMCNC: 24.5 PG — LOW (ref 27–34)
MCHC RBC-ENTMCNC: 24.5 PG — LOW (ref 27–34)
MCHC RBC-ENTMCNC: 25.1 PG — LOW (ref 27–34)
MCHC RBC-ENTMCNC: 32.5 G/DL — SIGNIFICANT CHANGE UP (ref 32–36)
MCHC RBC-ENTMCNC: 33.1 G/DL — SIGNIFICANT CHANGE UP (ref 32–36)
MCHC RBC-ENTMCNC: 33.5 G/DL — SIGNIFICANT CHANGE UP (ref 32–36)
MCV RBC AUTO: 74 FL — LOW (ref 80–100)
MCV RBC AUTO: 75.1 FL — LOW (ref 80–100)
MCV RBC AUTO: 75.3 FL — LOW (ref 80–100)
PHOSPHATE SERPL-MCNC: 4.7 MG/DL — HIGH (ref 2.5–4.5)
PLATELET # BLD AUTO: 86 K/UL — LOW (ref 150–400)
PLATELET # BLD AUTO: 94 K/UL — LOW (ref 150–400)
PLATELET # BLD AUTO: 95 K/UL — LOW (ref 150–400)
POTASSIUM SERPL-MCNC: 3.5 MMOL/L — SIGNIFICANT CHANGE UP (ref 3.5–5.3)
POTASSIUM SERPL-MCNC: 4.2 MMOL/L — SIGNIFICANT CHANGE UP (ref 3.5–5.3)
POTASSIUM SERPL-MCNC: 4.2 MMOL/L — SIGNIFICANT CHANGE UP (ref 3.5–5.3)
POTASSIUM SERPL-SCNC: 3.5 MMOL/L — SIGNIFICANT CHANGE UP (ref 3.5–5.3)
POTASSIUM SERPL-SCNC: 4.2 MMOL/L — SIGNIFICANT CHANGE UP (ref 3.5–5.3)
POTASSIUM SERPL-SCNC: 4.2 MMOL/L — SIGNIFICANT CHANGE UP (ref 3.5–5.3)
PROT SERPL-MCNC: 6.9 G/DL — SIGNIFICANT CHANGE UP (ref 6.4–8.2)
PROTHROM AB SERPL-ACNC: 14.8 SEC — HIGH (ref 10–13.1)
PROTHROM AB SERPL-ACNC: 16.1 SEC — HIGH (ref 10–13.1)
PROTHROM AB SERPL-ACNC: >320 SEC — SIGNIFICANT CHANGE UP (ref 10–13.1)
RBC # BLD: 3.58 M/UL — LOW (ref 4.2–5.8)
RBC # BLD: 3.64 M/UL — LOW (ref 4.2–5.8)
RBC # BLD: 3.92 M/UL — LOW (ref 4.2–5.8)
RBC # FLD: 14 % — SIGNIFICANT CHANGE UP (ref 10.3–16.9)
RBC # FLD: 14.1 % — SIGNIFICANT CHANGE UP (ref 10.3–16.9)
RBC # FLD: 14.3 % — SIGNIFICANT CHANGE UP (ref 10.3–16.9)
SODIUM SERPL-SCNC: 139 MMOL/L — SIGNIFICANT CHANGE UP (ref 135–145)
SODIUM SERPL-SCNC: 139 MMOL/L — SIGNIFICANT CHANGE UP (ref 135–145)
SODIUM SERPL-SCNC: 144 MMOL/L — SIGNIFICANT CHANGE UP (ref 135–145)
WBC # BLD: 10.9 K/UL — HIGH (ref 3.8–10.5)
WBC # BLD: 11.3 K/UL — HIGH (ref 3.8–10.5)
WBC # BLD: 8.1 K/UL — SIGNIFICANT CHANGE UP (ref 3.8–10.5)
WBC # FLD AUTO: 10.9 K/UL — HIGH (ref 3.8–10.5)
WBC # FLD AUTO: 11.3 K/UL — HIGH (ref 3.8–10.5)
WBC # FLD AUTO: 8.1 K/UL — SIGNIFICANT CHANGE UP (ref 3.8–10.5)

## 2017-02-25 PROCEDURE — 71010: CPT | Mod: 26

## 2017-02-25 PROCEDURE — 99291 CRITICAL CARE FIRST HOUR: CPT

## 2017-02-25 PROCEDURE — 99292 CRITICAL CARE ADDL 30 MIN: CPT

## 2017-02-25 RX ORDER — DOCUSATE SODIUM 100 MG
100 CAPSULE ORAL THREE TIMES A DAY
Qty: 0 | Refills: 0 | Status: DISCONTINUED | OUTPATIENT
Start: 2017-02-25 | End: 2017-02-27

## 2017-02-25 RX ORDER — ASPIRIN/CALCIUM CARB/MAGNESIUM 324 MG
81 TABLET ORAL DAILY
Qty: 0 | Refills: 0 | Status: DISCONTINUED | OUTPATIENT
Start: 2017-02-25 | End: 2017-03-04

## 2017-02-25 RX ORDER — DORZOLAMIDE HYDROCHLORIDE TIMOLOL MALEATE 20; 5 MG/ML; MG/ML
1 SOLUTION/ DROPS OPHTHALMIC
Qty: 0 | Refills: 0 | Status: DISCONTINUED | OUTPATIENT
Start: 2017-02-25 | End: 2017-03-04

## 2017-02-25 RX ORDER — LATANOPROST 0.05 MG/ML
1 SOLUTION/ DROPS OPHTHALMIC; TOPICAL AT BEDTIME
Qty: 0 | Refills: 0 | Status: DISCONTINUED | OUTPATIENT
Start: 2017-02-25 | End: 2017-03-04

## 2017-02-25 RX ORDER — ALBUMIN HUMAN 25 %
250 VIAL (ML) INTRAVENOUS ONCE
Qty: 0 | Refills: 0 | Status: COMPLETED | OUTPATIENT
Start: 2017-02-25 | End: 2017-02-25

## 2017-02-25 RX ORDER — POTASSIUM CHLORIDE 20 MEQ
20 PACKET (EA) ORAL
Qty: 0 | Refills: 0 | Status: COMPLETED | OUTPATIENT
Start: 2017-02-25 | End: 2017-02-25

## 2017-02-25 RX ORDER — HEPARIN SODIUM 5000 [USP'U]/ML
5000 INJECTION INTRAVENOUS; SUBCUTANEOUS EVERY 8 HOURS
Qty: 0 | Refills: 0 | Status: DISCONTINUED | OUTPATIENT
Start: 2017-02-25 | End: 2017-02-27

## 2017-02-25 RX ORDER — BRIMONIDINE TARTRATE 2 MG/MG
1 SOLUTION/ DROPS OPHTHALMIC THREE TIMES A DAY
Qty: 0 | Refills: 0 | Status: DISCONTINUED | OUTPATIENT
Start: 2017-02-25 | End: 2017-03-04

## 2017-02-25 RX ORDER — FAMOTIDINE 10 MG/ML
20 INJECTION INTRAVENOUS
Qty: 0 | Refills: 0 | Status: DISCONTINUED | OUTPATIENT
Start: 2017-02-25 | End: 2017-03-04

## 2017-02-25 RX ORDER — ACETAMINOPHEN 500 MG
1000 TABLET ORAL ONCE
Qty: 0 | Refills: 0 | Status: COMPLETED | OUTPATIENT
Start: 2017-02-25 | End: 2017-02-25

## 2017-02-25 RX ORDER — KETOROLAC TROMETHAMINE 30 MG/ML
30 SYRINGE (ML) INJECTION ONCE
Qty: 0 | Refills: 0 | Status: DISCONTINUED | OUTPATIENT
Start: 2017-02-25 | End: 2017-02-25

## 2017-02-25 RX ORDER — SENNA PLUS 8.6 MG/1
2 TABLET ORAL AT BEDTIME
Qty: 0 | Refills: 0 | Status: DISCONTINUED | OUTPATIENT
Start: 2017-02-25 | End: 2017-02-27

## 2017-02-25 RX ADMIN — BRIMONIDINE TARTRATE 1 DROP(S): 2 SOLUTION/ DROPS OPHTHALMIC at 21:56

## 2017-02-25 RX ADMIN — SENNA PLUS 2 TABLET(S): 8.6 TABLET ORAL at 21:57

## 2017-02-25 RX ADMIN — HEPARIN SODIUM 5000 UNIT(S): 5000 INJECTION INTRAVENOUS; SUBCUTANEOUS at 21:56

## 2017-02-25 RX ADMIN — Medication 125 MILLILITER(S): at 16:09

## 2017-02-25 RX ADMIN — LATANOPROST 1 DROP(S): 0.05 SOLUTION/ DROPS OPHTHALMIC; TOPICAL at 21:55

## 2017-02-25 RX ADMIN — Medication 100 MILLIGRAM(S): at 03:08

## 2017-02-25 RX ADMIN — Medication 50 MILLIEQUIVALENT(S): at 06:50

## 2017-02-25 RX ADMIN — Medication 50 MILLIEQUIVALENT(S): at 06:48

## 2017-02-25 RX ADMIN — Medication 100 MILLIGRAM(S): at 10:27

## 2017-02-25 RX ADMIN — Medication 125 MILLILITER(S): at 15:34

## 2017-02-25 RX ADMIN — FAMOTIDINE 20 MILLIGRAM(S): 10 INJECTION INTRAVENOUS at 18:44

## 2017-02-25 RX ADMIN — Medication 30 MILLIGRAM(S): at 04:49

## 2017-02-25 RX ADMIN — Medication 1000 MILLIGRAM(S): at 18:43

## 2017-02-25 RX ADMIN — Medication 100 MILLIGRAM(S): at 14:33

## 2017-02-25 RX ADMIN — Medication 100 MILLIGRAM(S): at 18:44

## 2017-02-25 RX ADMIN — BRIMONIDINE TARTRATE 1 DROP(S): 2 SOLUTION/ DROPS OPHTHALMIC at 14:33

## 2017-02-25 RX ADMIN — HEPARIN SODIUM 5000 UNIT(S): 5000 INJECTION INTRAVENOUS; SUBCUTANEOUS at 14:33

## 2017-02-25 RX ADMIN — Medication 100 MILLIGRAM(S): at 21:57

## 2017-02-25 RX ADMIN — Medication 400 MILLIGRAM(S): at 17:10

## 2017-02-25 RX ADMIN — DORZOLAMIDE HYDROCHLORIDE TIMOLOL MALEATE 1 DROP(S): 20; 5 SOLUTION/ DROPS OPHTHALMIC at 19:13

## 2017-02-25 RX ADMIN — Medication 81 MILLIGRAM(S): at 12:10

## 2017-02-25 RX ADMIN — Medication 30 MILLIGRAM(S): at 03:33

## 2017-02-25 RX ADMIN — Medication 50 MILLIEQUIVALENT(S): at 05:49

## 2017-02-25 RX ADMIN — Medication 50 MILLIEQUIVALENT(S): at 07:03

## 2017-02-25 NOTE — PHYSICAL THERAPY INITIAL EVALUATION ADULT - RANGE OF MOTION EXAMINATION, REHAB EVAL
Left UE ROM was WFL (within functional limits)/Right UE ROM was WFL (within functional limits)/Right LE ROM was WFL (within functional limits)/Left LE ROM was WFL (within functional limits)

## 2017-02-25 NOTE — PHYSICAL THERAPY INITIAL EVALUATION ADULT - CRITERIA FOR SKILLED THERAPEUTIC INTERVENTIONS
anticipated equipment needs at discharge/impairments found/functional limitations in following categories/anticipated discharge recommendation/therapy frequency/risk reduction/prevention/rehab potential/predicted duration of therapy intervention

## 2017-02-25 NOTE — PHYSICAL THERAPY INITIAL EVALUATION ADULT - GENERAL OBSERVATIONS, REHAB EVAL
seated in bedside chair, call bell, IV, telemetry, radial neo, chest tube-removed from suction for ambulation, TLC.
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
## 2017-02-25 NOTE — PHYSICAL THERAPY INITIAL EVALUATION ADULT - IMPAIRMENTS FOUND, PT EVAL
gait, locomotion, and balance/muscle strength/aerobic capacity/endurance/ROM/gross motor/poor safety awareness

## 2017-02-25 NOTE — PHYSICAL THERAPY INITIAL EVALUATION ADULT - PERTINENT HX OF CURRENT PROBLEM, REHAB EVAL
72 year old, Creole speaking, with a past medical history of hypertension and hyperlipidemia presents for evaluation and management of valvular disease. pt present for surgery with Dr. Jose

## 2017-02-26 LAB
ALBUMIN SERPL ELPH-MCNC: 3.7 G/DL — SIGNIFICANT CHANGE UP (ref 3.4–5)
ALP SERPL-CCNC: 61 U/L — SIGNIFICANT CHANGE UP (ref 40–120)
ALT FLD-CCNC: 20 U/L — SIGNIFICANT CHANGE UP (ref 12–42)
ANION GAP SERPL CALC-SCNC: 5 MMOL/L — LOW (ref 9–16)
APTT BLD: 30.4 SEC — SIGNIFICANT CHANGE UP (ref 27.5–37.4)
AST SERPL-CCNC: 38 U/L — HIGH (ref 15–37)
BASOPHILS NFR BLD AUTO: 0.1 % — SIGNIFICANT CHANGE UP (ref 0–2)
BILIRUB SERPL-MCNC: 1.1 MG/DL — SIGNIFICANT CHANGE UP (ref 0.2–1.2)
BUN SERPL-MCNC: 14 MG/DL — SIGNIFICANT CHANGE UP (ref 7–23)
CA-I BLD-SCNC: 1.13 MMOL/L — SIGNIFICANT CHANGE UP (ref 1.05–1.34)
CALCIUM SERPL-MCNC: 8.2 MG/DL — LOW (ref 8.5–10.5)
CHLORIDE SERPL-SCNC: 106 MMOL/L — SIGNIFICANT CHANGE UP (ref 96–108)
CO2 SERPL-SCNC: 27 MMOL/L — SIGNIFICANT CHANGE UP (ref 22–31)
CREAT SERPL-MCNC: 0.66 MG/DL — SIGNIFICANT CHANGE UP (ref 0.5–1.3)
EOSINOPHIL NFR BLD AUTO: 0.1 % — SIGNIFICANT CHANGE UP (ref 0–6)
GAS PNL BLDA: SIGNIFICANT CHANGE UP
GLUCOSE SERPL-MCNC: 132 MG/DL — HIGH (ref 70–99)
HCT VFR BLD CALC: 30 % — LOW (ref 39–50)
HGB BLD-MCNC: 10 G/DL — LOW (ref 13–17)
INR BLD: 1.47 — HIGH (ref 0.88–1.16)
LACTATE SERPL-SCNC: 0.8 MMOL/L — SIGNIFICANT CHANGE UP (ref 0.5–2)
LYMPHOCYTES # BLD AUTO: 15 % — SIGNIFICANT CHANGE UP (ref 13–44)
MAGNESIUM SERPL-MCNC: 2 MG/DL — SIGNIFICANT CHANGE UP (ref 1.6–2.4)
MCHC RBC-ENTMCNC: 25.1 PG — LOW (ref 27–34)
MCHC RBC-ENTMCNC: 33.3 G/DL — SIGNIFICANT CHANGE UP (ref 32–36)
MCV RBC AUTO: 75.4 FL — LOW (ref 80–100)
MONOCYTES NFR BLD AUTO: 7.3 % — SIGNIFICANT CHANGE UP (ref 2–14)
NEUTROPHILS NFR BLD AUTO: 77.5 % — HIGH (ref 43–77)
PLATELET # BLD AUTO: 83 K/UL — LOW (ref 150–400)
POTASSIUM SERPL-MCNC: 4.1 MMOL/L — SIGNIFICANT CHANGE UP (ref 3.5–5.3)
POTASSIUM SERPL-SCNC: 4.1 MMOL/L — SIGNIFICANT CHANGE UP (ref 3.5–5.3)
PROT SERPL-MCNC: 6.9 G/DL — SIGNIFICANT CHANGE UP (ref 6.4–8.2)
PROTHROM AB SERPL-ACNC: 16.4 SEC — HIGH (ref 10–13.1)
RBC # BLD: 3.98 M/UL — LOW (ref 4.2–5.8)
RBC # FLD: 14.2 % — SIGNIFICANT CHANGE UP (ref 10.3–16.9)
SODIUM SERPL-SCNC: 138 MMOL/L — SIGNIFICANT CHANGE UP (ref 135–145)
WBC # BLD: 12.3 K/UL — HIGH (ref 3.8–10.5)
WBC # FLD AUTO: 12.3 K/UL — HIGH (ref 3.8–10.5)

## 2017-02-26 PROCEDURE — 99291 CRITICAL CARE FIRST HOUR: CPT

## 2017-02-26 PROCEDURE — 71010: CPT | Mod: 26

## 2017-02-26 RX ORDER — ALBUMIN HUMAN 25 %
250 VIAL (ML) INTRAVENOUS
Qty: 0 | Refills: 0 | Status: DISCONTINUED | OUTPATIENT
Start: 2017-02-26 | End: 2017-02-27

## 2017-02-26 RX ORDER — METOPROLOL TARTRATE 50 MG
25 TABLET ORAL
Qty: 0 | Refills: 0 | Status: DISCONTINUED | OUTPATIENT
Start: 2017-02-26 | End: 2017-02-27

## 2017-02-26 RX ORDER — POTASSIUM CHLORIDE 20 MEQ
20 PACKET (EA) ORAL ONCE
Qty: 0 | Refills: 0 | Status: COMPLETED | OUTPATIENT
Start: 2017-02-26 | End: 2017-02-26

## 2017-02-26 RX ORDER — FUROSEMIDE 40 MG
20 TABLET ORAL ONCE
Qty: 0 | Refills: 0 | Status: COMPLETED | OUTPATIENT
Start: 2017-02-26 | End: 2017-02-26

## 2017-02-26 RX ORDER — ZALEPLON 10 MG
5 CAPSULE ORAL AT BEDTIME
Qty: 0 | Refills: 0 | Status: DISCONTINUED | OUTPATIENT
Start: 2017-02-26 | End: 2017-03-04

## 2017-02-26 RX ORDER — ZOLPIDEM TARTRATE 10 MG/1
5 TABLET ORAL AT BEDTIME
Qty: 0 | Refills: 0 | Status: DISCONTINUED | OUTPATIENT
Start: 2017-02-26 | End: 2017-02-26

## 2017-02-26 RX ORDER — AMIODARONE HYDROCHLORIDE 400 MG/1
150 TABLET ORAL ONCE
Qty: 0 | Refills: 0 | Status: COMPLETED | OUTPATIENT
Start: 2017-02-26 | End: 2017-02-27

## 2017-02-26 RX ADMIN — Medication 25 MILLIGRAM(S): at 09:36

## 2017-02-26 RX ADMIN — Medication 100 MILLIGRAM(S): at 13:48

## 2017-02-26 RX ADMIN — SENNA PLUS 2 TABLET(S): 8.6 TABLET ORAL at 21:58

## 2017-02-26 RX ADMIN — DORZOLAMIDE HYDROCHLORIDE TIMOLOL MALEATE 1 DROP(S): 20; 5 SOLUTION/ DROPS OPHTHALMIC at 18:27

## 2017-02-26 RX ADMIN — Medication 100 MILLIGRAM(S): at 21:59

## 2017-02-26 RX ADMIN — Medication 25 MILLIGRAM(S): at 18:23

## 2017-02-26 RX ADMIN — HEPARIN SODIUM 5000 UNIT(S): 5000 INJECTION INTRAVENOUS; SUBCUTANEOUS at 13:48

## 2017-02-26 RX ADMIN — LATANOPROST 1 DROP(S): 0.05 SOLUTION/ DROPS OPHTHALMIC; TOPICAL at 22:00

## 2017-02-26 RX ADMIN — HEPARIN SODIUM 5000 UNIT(S): 5000 INJECTION INTRAVENOUS; SUBCUTANEOUS at 21:58

## 2017-02-26 RX ADMIN — BRIMONIDINE TARTRATE 1 DROP(S): 2 SOLUTION/ DROPS OPHTHALMIC at 13:54

## 2017-02-26 RX ADMIN — FAMOTIDINE 20 MILLIGRAM(S): 10 INJECTION INTRAVENOUS at 06:12

## 2017-02-26 RX ADMIN — DORZOLAMIDE HYDROCHLORIDE TIMOLOL MALEATE 1 DROP(S): 20; 5 SOLUTION/ DROPS OPHTHALMIC at 06:07

## 2017-02-26 RX ADMIN — Medication 100 MILLIGRAM(S): at 06:10

## 2017-02-26 RX ADMIN — Medication 20 MILLIEQUIVALENT(S): at 09:53

## 2017-02-26 RX ADMIN — FAMOTIDINE 20 MILLIGRAM(S): 10 INJECTION INTRAVENOUS at 18:23

## 2017-02-26 RX ADMIN — Medication 20 MILLIGRAM(S): at 09:53

## 2017-02-26 RX ADMIN — BRIMONIDINE TARTRATE 1 DROP(S): 2 SOLUTION/ DROPS OPHTHALMIC at 06:08

## 2017-02-26 RX ADMIN — Medication 81 MILLIGRAM(S): at 12:45

## 2017-02-26 RX ADMIN — HEPARIN SODIUM 5000 UNIT(S): 5000 INJECTION INTRAVENOUS; SUBCUTANEOUS at 06:11

## 2017-02-26 RX ADMIN — BRIMONIDINE TARTRATE 1 DROP(S): 2 SOLUTION/ DROPS OPHTHALMIC at 22:01

## 2017-02-26 RX ADMIN — Medication 100 MILLIGRAM(S): at 03:00
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
## 2017-02-27 DIAGNOSIS — I48.91 UNSPECIFIED ATRIAL FIBRILLATION: ICD-10-CM

## 2017-02-27 LAB
ANION GAP SERPL CALC-SCNC: 5 MMOL/L — LOW (ref 9–16)
ANION GAP SERPL CALC-SCNC: 5 MMOL/L — LOW (ref 9–16)
APTT BLD: 30.3 SEC — SIGNIFICANT CHANGE UP (ref 27.5–37.4)
BUN SERPL-MCNC: 7 MG/DL — SIGNIFICANT CHANGE UP (ref 7–23)
BUN SERPL-MCNC: 9 MG/DL — SIGNIFICANT CHANGE UP (ref 7–23)
CALCIUM SERPL-MCNC: 8 MG/DL — LOW (ref 8.5–10.5)
CALCIUM SERPL-MCNC: 8.4 MG/DL — LOW (ref 8.5–10.5)
CHLORIDE SERPL-SCNC: 104 MMOL/L — SIGNIFICANT CHANGE UP (ref 96–108)
CHLORIDE SERPL-SCNC: 104 MMOL/L — SIGNIFICANT CHANGE UP (ref 96–108)
CO2 SERPL-SCNC: 29 MMOL/L — SIGNIFICANT CHANGE UP (ref 22–31)
CO2 SERPL-SCNC: 30 MMOL/L — SIGNIFICANT CHANGE UP (ref 22–31)
CREAT SERPL-MCNC: 0.63 MG/DL — SIGNIFICANT CHANGE UP (ref 0.5–1.3)
CREAT SERPL-MCNC: 0.63 MG/DL — SIGNIFICANT CHANGE UP (ref 0.5–1.3)
GAS PNL BLDA: SIGNIFICANT CHANGE UP
GLUCOSE SERPL-MCNC: 118 MG/DL — HIGH (ref 70–99)
GLUCOSE SERPL-MCNC: 97 MG/DL — SIGNIFICANT CHANGE UP (ref 70–99)
HCT VFR BLD CALC: 26.2 % — LOW (ref 39–50)
HCT VFR BLD CALC: 27.7 % — LOW (ref 39–50)
HGB BLD-MCNC: 8.9 G/DL — LOW (ref 13–17)
HGB BLD-MCNC: 8.9 G/DL — LOW (ref 13–17)
INR BLD: 1.23 — HIGH (ref 0.88–1.16)
MAGNESIUM SERPL-MCNC: 2 MG/DL — SIGNIFICANT CHANGE UP (ref 1.6–2.4)
MAGNESIUM SERPL-MCNC: 2.3 MG/DL — SIGNIFICANT CHANGE UP (ref 1.6–2.4)
MCHC RBC-ENTMCNC: 24.5 PG — LOW (ref 27–34)
MCHC RBC-ENTMCNC: 24.9 PG — LOW (ref 27–34)
MCHC RBC-ENTMCNC: 32.1 G/DL — SIGNIFICANT CHANGE UP (ref 32–36)
MCHC RBC-ENTMCNC: 34 G/DL — SIGNIFICANT CHANGE UP (ref 32–36)
MCV RBC AUTO: 73.4 FL — LOW (ref 80–100)
MCV RBC AUTO: 76.1 FL — LOW (ref 80–100)
PHOSPHATE SERPL-MCNC: 1.9 MG/DL — LOW (ref 2.5–4.5)
PLATELET # BLD AUTO: 72 K/UL — LOW (ref 150–400)
PLATELET # BLD AUTO: 79 K/UL — LOW (ref 150–400)
POTASSIUM SERPL-MCNC: 3.8 MMOL/L — SIGNIFICANT CHANGE UP (ref 3.5–5.3)
POTASSIUM SERPL-MCNC: 4.4 MMOL/L — SIGNIFICANT CHANGE UP (ref 3.5–5.3)
POTASSIUM SERPL-SCNC: 3.8 MMOL/L — SIGNIFICANT CHANGE UP (ref 3.5–5.3)
POTASSIUM SERPL-SCNC: 4.4 MMOL/L — SIGNIFICANT CHANGE UP (ref 3.5–5.3)
PROTHROM AB SERPL-ACNC: 13.7 SEC — HIGH (ref 10–13.1)
RBC # BLD: 3.57 M/UL — LOW (ref 4.2–5.8)
RBC # BLD: 3.64 M/UL — LOW (ref 4.2–5.8)
RBC # FLD: 13.7 % — SIGNIFICANT CHANGE UP (ref 10.3–16.9)
RBC # FLD: 14.2 % — SIGNIFICANT CHANGE UP (ref 10.3–16.9)
SODIUM SERPL-SCNC: 138 MMOL/L — SIGNIFICANT CHANGE UP (ref 135–145)
SODIUM SERPL-SCNC: 139 MMOL/L — SIGNIFICANT CHANGE UP (ref 135–145)
SURGICAL PATHOLOGY STUDY: SIGNIFICANT CHANGE UP
WBC # BLD: 7.4 K/UL — SIGNIFICANT CHANGE UP (ref 3.8–10.5)
WBC # BLD: 8.2 K/UL — SIGNIFICANT CHANGE UP (ref 3.8–10.5)
WBC # FLD AUTO: 7.4 K/UL — SIGNIFICANT CHANGE UP (ref 3.8–10.5)
WBC # FLD AUTO: 8.2 K/UL — SIGNIFICANT CHANGE UP (ref 3.8–10.5)

## 2017-02-27 PROCEDURE — 99292 CRITICAL CARE ADDL 30 MIN: CPT

## 2017-02-27 PROCEDURE — 99291 CRITICAL CARE FIRST HOUR: CPT

## 2017-02-27 PROCEDURE — 93306 TTE W/DOPPLER COMPLETE: CPT | Mod: 26

## 2017-02-27 PROCEDURE — 71010: CPT | Mod: 26,77

## 2017-02-27 PROCEDURE — 71010: CPT | Mod: 26

## 2017-02-27 RX ORDER — PHENYLEPHRINE HYDROCHLORIDE 10 MG/ML
0.5 INJECTION INTRAVENOUS
Qty: 80 | Refills: 0 | Status: DISCONTINUED | OUTPATIENT
Start: 2017-02-27 | End: 2017-02-27

## 2017-02-27 RX ORDER — ALBUMIN HUMAN 25 %
250 VIAL (ML) INTRAVENOUS ONCE
Qty: 0 | Refills: 0 | Status: COMPLETED | OUTPATIENT
Start: 2017-02-27 | End: 2017-02-27

## 2017-02-27 RX ORDER — SODIUM CHLORIDE 9 MG/ML
500 INJECTION, SOLUTION INTRAVENOUS
Qty: 0 | Refills: 0 | Status: DISCONTINUED | OUTPATIENT
Start: 2017-02-27 | End: 2017-03-01

## 2017-02-27 RX ORDER — METOPROLOL TARTRATE 50 MG
5 TABLET ORAL ONCE
Qty: 0 | Refills: 0 | Status: COMPLETED | OUTPATIENT
Start: 2017-02-27 | End: 2017-02-27

## 2017-02-27 RX ORDER — HEPARIN SODIUM 5000 [USP'U]/ML
800 INJECTION INTRAVENOUS; SUBCUTANEOUS
Qty: 25000 | Refills: 0 | Status: DISCONTINUED | OUTPATIENT
Start: 2017-02-27 | End: 2017-02-28

## 2017-02-27 RX ORDER — POTASSIUM PHOSPHATE, MONOBASIC POTASSIUM PHOSPHATE, DIBASIC 236; 224 MG/ML; MG/ML
15 INJECTION, SOLUTION INTRAVENOUS ONCE
Qty: 0 | Refills: 0 | Status: COMPLETED | OUTPATIENT
Start: 2017-02-27 | End: 2017-02-27

## 2017-02-27 RX ORDER — AMIODARONE HYDROCHLORIDE 400 MG/1
200 TABLET ORAL DAILY
Qty: 0 | Refills: 0 | Status: COMPLETED | OUTPATIENT
Start: 2017-03-03 | End: 2018-01-30

## 2017-02-27 RX ORDER — AMIODARONE HYDROCHLORIDE 400 MG/1
400 TABLET ORAL EVERY 8 HOURS
Qty: 0 | Refills: 0 | Status: COMPLETED | OUTPATIENT
Start: 2017-02-27 | End: 2017-03-02

## 2017-02-27 RX ORDER — AMIODARONE HYDROCHLORIDE 400 MG/1
0.5 TABLET ORAL
Qty: 900 | Refills: 0 | Status: DISCONTINUED | OUTPATIENT
Start: 2017-02-27 | End: 2017-02-27

## 2017-02-27 RX ORDER — DIGOXIN 250 MCG
0.25 TABLET ORAL ONCE
Qty: 0 | Refills: 0 | Status: COMPLETED | OUTPATIENT
Start: 2017-02-27 | End: 2017-02-27

## 2017-02-27 RX ORDER — KETOROLAC TROMETHAMINE 30 MG/ML
15 SYRINGE (ML) INJECTION ONCE
Qty: 0 | Refills: 0 | Status: DISCONTINUED | OUTPATIENT
Start: 2017-02-27 | End: 2017-02-27

## 2017-02-27 RX ORDER — MAGNESIUM OXIDE 400 MG ORAL TABLET 241.3 MG
800 TABLET ORAL ONCE
Qty: 0 | Refills: 0 | Status: COMPLETED | OUTPATIENT
Start: 2017-02-27 | End: 2017-02-27

## 2017-02-27 RX ORDER — AMIODARONE HYDROCHLORIDE 400 MG/1
150 TABLET ORAL ONCE
Qty: 0 | Refills: 0 | Status: COMPLETED | OUTPATIENT
Start: 2017-02-27 | End: 2017-02-27

## 2017-02-27 RX ORDER — POTASSIUM CHLORIDE 20 MEQ
40 PACKET (EA) ORAL ONCE
Qty: 0 | Refills: 0 | Status: COMPLETED | OUTPATIENT
Start: 2017-02-27 | End: 2017-02-27

## 2017-02-27 RX ORDER — DIGOXIN 250 MCG
0.5 TABLET ORAL ONCE
Qty: 0 | Refills: 0 | Status: COMPLETED | OUTPATIENT
Start: 2017-02-27 | End: 2017-02-27

## 2017-02-27 RX ORDER — SODIUM CHLORIDE 9 MG/ML
500 INJECTION, SOLUTION INTRAVENOUS ONCE
Qty: 0 | Refills: 0 | Status: COMPLETED | OUTPATIENT
Start: 2017-02-27 | End: 2017-02-27

## 2017-02-27 RX ADMIN — SODIUM CHLORIDE 1000 MILLILITER(S): 9 INJECTION, SOLUTION INTRAVENOUS at 00:36

## 2017-02-27 RX ADMIN — Medication 125 MILLILITER(S): at 00:40

## 2017-02-27 RX ADMIN — AMIODARONE HYDROCHLORIDE 600 MILLIGRAM(S): 400 TABLET ORAL at 23:00

## 2017-02-27 RX ADMIN — Medication 0.5 MILLIGRAM(S): at 10:57

## 2017-02-27 RX ADMIN — FAMOTIDINE 20 MILLIGRAM(S): 10 INJECTION INTRAVENOUS at 17:56

## 2017-02-27 RX ADMIN — BRIMONIDINE TARTRATE 1 DROP(S): 2 SOLUTION/ DROPS OPHTHALMIC at 06:00

## 2017-02-27 RX ADMIN — Medication 125 MILLILITER(S): at 00:34

## 2017-02-27 RX ADMIN — Medication 125 MILLILITER(S): at 15:22

## 2017-02-27 RX ADMIN — AMIODARONE HYDROCHLORIDE 600 MILLIGRAM(S): 400 TABLET ORAL at 00:39

## 2017-02-27 RX ADMIN — AMIODARONE HYDROCHLORIDE 400 MILLIGRAM(S): 400 TABLET ORAL at 15:23

## 2017-02-27 RX ADMIN — FAMOTIDINE 20 MILLIGRAM(S): 10 INJECTION INTRAVENOUS at 05:58

## 2017-02-27 RX ADMIN — Medication 100 MILLIGRAM(S): at 05:57

## 2017-02-27 RX ADMIN — BRIMONIDINE TARTRATE 1 DROP(S): 2 SOLUTION/ DROPS OPHTHALMIC at 21:29

## 2017-02-27 RX ADMIN — DORZOLAMIDE HYDROCHLORIDE TIMOLOL MALEATE 1 DROP(S): 20; 5 SOLUTION/ DROPS OPHTHALMIC at 06:01

## 2017-02-27 RX ADMIN — HEPARIN SODIUM 5000 UNIT(S): 5000 INJECTION INTRAVENOUS; SUBCUTANEOUS at 15:23

## 2017-02-27 RX ADMIN — AMIODARONE HYDROCHLORIDE 600 MILLIGRAM(S): 400 TABLET ORAL at 01:42

## 2017-02-27 RX ADMIN — Medication 5 MILLIGRAM(S): at 03:51

## 2017-02-27 RX ADMIN — DORZOLAMIDE HYDROCHLORIDE TIMOLOL MALEATE 1 DROP(S): 20; 5 SOLUTION/ DROPS OPHTHALMIC at 17:50

## 2017-02-27 RX ADMIN — AMIODARONE HYDROCHLORIDE 300 MILLIGRAM(S): 400 TABLET ORAL at 22:30

## 2017-02-27 RX ADMIN — Medication 125 MILLILITER(S): at 06:24

## 2017-02-27 RX ADMIN — LATANOPROST 1 DROP(S): 0.05 SOLUTION/ DROPS OPHTHALMIC; TOPICAL at 21:29

## 2017-02-27 RX ADMIN — Medication 0.25 MILLIGRAM(S): at 02:51

## 2017-02-27 RX ADMIN — BRIMONIDINE TARTRATE 1 DROP(S): 2 SOLUTION/ DROPS OPHTHALMIC at 15:26

## 2017-02-27 RX ADMIN — AMIODARONE HYDROCHLORIDE 400 MILLIGRAM(S): 400 TABLET ORAL at 09:14

## 2017-02-27 RX ADMIN — Medication 40 MILLIEQUIVALENT(S): at 09:26

## 2017-02-27 RX ADMIN — Medication 125 MILLILITER(S): at 15:21

## 2017-02-27 RX ADMIN — HEPARIN SODIUM 5000 UNIT(S): 5000 INJECTION INTRAVENOUS; SUBCUTANEOUS at 05:56

## 2017-02-27 RX ADMIN — Medication 81 MILLIGRAM(S): at 09:03

## 2017-02-27 RX ADMIN — MAGNESIUM OXIDE 400 MG ORAL TABLET 800 MILLIGRAM(S): 241.3 TABLET ORAL at 07:47

## 2017-02-27 RX ADMIN — POTASSIUM PHOSPHATE, MONOBASIC POTASSIUM PHOSPHATE, DIBASIC 62.5 MILLIMOLE(S): 236; 224 INJECTION, SOLUTION INTRAVENOUS at 07:47

## 2017-02-27 RX ADMIN — HEPARIN SODIUM 800 UNIT(S)/HR: 5000 INJECTION INTRAVENOUS; SUBCUTANEOUS at 17:49

## 2017-02-27 RX ADMIN — Medication 15 MILLIGRAM(S): at 11:13

## 2017-02-27 RX ADMIN — Medication 125 MILLILITER(S): at 05:25

## 2017-02-27 RX ADMIN — AMIODARONE HYDROCHLORIDE 400 MILLIGRAM(S): 400 TABLET ORAL at 21:28

## 2017-02-27 NOTE — CONSULT NOTE ADULT - ASSESSMENT
Mr Waddell is a 73 y/o male with hx of HTN, HLD admitted for elective AVR.  Cardiac catheterization 2/15/17 revealed normal coronaries. Repeat echocardiogram revealed: the left ventricular ejection fraction is estimated to be 65-70%.   2/24/27 s/p Minimal invasive AVR (bioprosthetic) with development of Atrial fibrillation> atrial flutter with rapid ventricular rate. CHADS2-Vasc 2.

## 2017-02-27 NOTE — CONSULT NOTE ADULT - PROBLEM SELECTOR RECOMMENDATION 9
anticoagulation  Continue rate control as ordered, continue VVI backup given all AV puneet agents administered.  DCCV in AM if no spontaneous conversion, No need for SAY
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
## 2017-02-27 NOTE — CONSULT NOTE ADULT - ATTENDING COMMENTS
Post op afib, nor fluter. Difficult to control. Anticoagulation and cardioversion if flutter sustains.
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
## 2017-02-28 LAB
ANION GAP SERPL CALC-SCNC: 5 MMOL/L — LOW (ref 9–16)
ANION GAP SERPL CALC-SCNC: 7 MMOL/L — LOW (ref 9–16)
APTT BLD: 29.9 SEC — SIGNIFICANT CHANGE UP (ref 27.5–37.4)
APTT BLD: 29.9 SEC — SIGNIFICANT CHANGE UP (ref 27.5–37.4)
BASE EXCESS BLDA CALC-SCNC: 0.6 MMOL/L — SIGNIFICANT CHANGE UP (ref -2–3)
BUN SERPL-MCNC: 10 MG/DL — SIGNIFICANT CHANGE UP (ref 7–23)
BUN SERPL-MCNC: 12 MG/DL — SIGNIFICANT CHANGE UP (ref 7–23)
CALCIUM SERPL-MCNC: 8.8 MG/DL — SIGNIFICANT CHANGE UP (ref 8.5–10.5)
CALCIUM SERPL-MCNC: 9 MG/DL — SIGNIFICANT CHANGE UP (ref 8.5–10.5)
CHLORIDE SERPL-SCNC: 105 MMOL/L — SIGNIFICANT CHANGE UP (ref 96–108)
CHLORIDE SERPL-SCNC: 105 MMOL/L — SIGNIFICANT CHANGE UP (ref 96–108)
CO2 SERPL-SCNC: 28 MMOL/L — SIGNIFICANT CHANGE UP (ref 22–31)
CO2 SERPL-SCNC: 30 MMOL/L — SIGNIFICANT CHANGE UP (ref 22–31)
CREAT SERPL-MCNC: 0.66 MG/DL — SIGNIFICANT CHANGE UP (ref 0.5–1.3)
CREAT SERPL-MCNC: 0.76 MG/DL — SIGNIFICANT CHANGE UP (ref 0.5–1.3)
GAS PNL BLDA: SIGNIFICANT CHANGE UP
GLUCOSE SERPL-MCNC: 111 MG/DL — HIGH (ref 70–99)
GLUCOSE SERPL-MCNC: 99 MG/DL — SIGNIFICANT CHANGE UP (ref 70–99)
HCO3 BLDA-SCNC: 25 MMOL/L — SIGNIFICANT CHANGE UP (ref 21–28)
HCT VFR BLD CALC: 26.6 % — LOW (ref 39–50)
HCT VFR BLD CALC: 27.8 % — LOW (ref 39–50)
HGB BLD-MCNC: 8.7 G/DL — LOW (ref 13–17)
HGB BLD-MCNC: 9 G/DL — LOW (ref 13–17)
INR BLD: 1.15 — SIGNIFICANT CHANGE UP (ref 0.88–1.16)
INR BLD: 1.2 — HIGH (ref 0.88–1.16)
LACTATE SERPL-SCNC: 0.8 MMOL/L — SIGNIFICANT CHANGE UP (ref 0.5–2)
MAGNESIUM SERPL-MCNC: 2.1 MG/DL — SIGNIFICANT CHANGE UP (ref 1.6–2.4)
MAGNESIUM SERPL-MCNC: 2.1 MG/DL — SIGNIFICANT CHANGE UP (ref 1.6–2.4)
MCHC RBC-ENTMCNC: 24.3 PG — LOW (ref 27–34)
MCHC RBC-ENTMCNC: 24.5 PG — LOW (ref 27–34)
MCHC RBC-ENTMCNC: 32.4 G/DL — SIGNIFICANT CHANGE UP (ref 32–36)
MCHC RBC-ENTMCNC: 32.7 G/DL — SIGNIFICANT CHANGE UP (ref 32–36)
MCV RBC AUTO: 74.3 FL — LOW (ref 80–100)
MCV RBC AUTO: 75.7 FL — LOW (ref 80–100)
PCO2 BLDA: 41 MMHG — SIGNIFICANT CHANGE UP (ref 35–48)
PH BLDA: 7.41 — SIGNIFICANT CHANGE UP (ref 7.35–7.45)
PHOSPHATE SERPL-MCNC: 1.2 MG/DL — LOW (ref 2.5–4.5)
PLATELET # BLD AUTO: 120 K/UL — LOW (ref 150–400)
PLATELET # BLD AUTO: 94 K/UL — LOW (ref 150–400)
PO2 BLDA: 87 MMHG — SIGNIFICANT CHANGE UP (ref 83–108)
POTASSIUM SERPL-MCNC: 4 MMOL/L — SIGNIFICANT CHANGE UP (ref 3.5–5.3)
POTASSIUM SERPL-MCNC: 4.1 MMOL/L — SIGNIFICANT CHANGE UP (ref 3.5–5.3)
POTASSIUM SERPL-SCNC: 4 MMOL/L — SIGNIFICANT CHANGE UP (ref 3.5–5.3)
POTASSIUM SERPL-SCNC: 4.1 MMOL/L — SIGNIFICANT CHANGE UP (ref 3.5–5.3)
PROTHROM AB SERPL-ACNC: 12.8 SEC — SIGNIFICANT CHANGE UP (ref 10–13.1)
PROTHROM AB SERPL-ACNC: 13.3 SEC — HIGH (ref 10–13.1)
RBC # BLD: 3.58 M/UL — LOW (ref 4.2–5.8)
RBC # BLD: 3.67 M/UL — LOW (ref 4.2–5.8)
RBC # FLD: 13.6 % — SIGNIFICANT CHANGE UP (ref 10.3–16.9)
RBC # FLD: 13.9 % — SIGNIFICANT CHANGE UP (ref 10.3–16.9)
SAO2 % BLDA: 97 % — SIGNIFICANT CHANGE UP (ref 95–100)
SODIUM SERPL-SCNC: 140 MMOL/L — SIGNIFICANT CHANGE UP (ref 135–145)
SODIUM SERPL-SCNC: 140 MMOL/L — SIGNIFICANT CHANGE UP (ref 135–145)
WBC # BLD: 5.6 K/UL — SIGNIFICANT CHANGE UP (ref 3.8–10.5)
WBC # BLD: 7 K/UL — SIGNIFICANT CHANGE UP (ref 3.8–10.5)
WBC # FLD AUTO: 5.6 K/UL — SIGNIFICANT CHANGE UP (ref 3.8–10.5)
WBC # FLD AUTO: 7 K/UL — SIGNIFICANT CHANGE UP (ref 3.8–10.5)

## 2017-02-28 PROCEDURE — 93320 DOPPLER ECHO COMPLETE: CPT | Mod: 26

## 2017-02-28 PROCEDURE — 71010: CPT | Mod: 26

## 2017-02-28 PROCEDURE — 92960 CARDIOVERSION ELECTRIC EXT: CPT

## 2017-02-28 PROCEDURE — 93010 ELECTROCARDIOGRAM REPORT: CPT

## 2017-02-28 PROCEDURE — 93312 ECHO TRANSESOPHAGEAL: CPT | Mod: 26,59

## 2017-02-28 PROCEDURE — 93325 DOPPLER ECHO COLOR FLOW MAPG: CPT | Mod: 26

## 2017-02-28 PROCEDURE — 99291 CRITICAL CARE FIRST HOUR: CPT

## 2017-02-28 PROCEDURE — 99292 CRITICAL CARE ADDL 30 MIN: CPT

## 2017-02-28 RX ORDER — HEPARIN SODIUM 5000 [USP'U]/ML
900 INJECTION INTRAVENOUS; SUBCUTANEOUS
Qty: 25000 | Refills: 0 | Status: DISCONTINUED | OUTPATIENT
Start: 2017-02-28 | End: 2017-02-28

## 2017-02-28 RX ORDER — ALBUMIN HUMAN 25 %
250 VIAL (ML) INTRAVENOUS ONCE
Qty: 0 | Refills: 0 | Status: COMPLETED | OUTPATIENT
Start: 2017-02-28 | End: 2017-02-28

## 2017-02-28 RX ORDER — METOPROLOL TARTRATE 50 MG
2.5 TABLET ORAL ONCE
Qty: 0 | Refills: 0 | Status: COMPLETED | OUTPATIENT
Start: 2017-02-28 | End: 2017-02-28

## 2017-02-28 RX ORDER — SENNA PLUS 8.6 MG/1
2 TABLET ORAL AT BEDTIME
Qty: 0 | Refills: 0 | Status: DISCONTINUED | OUTPATIENT
Start: 2017-02-28 | End: 2017-03-04

## 2017-02-28 RX ORDER — POTASSIUM CHLORIDE 20 MEQ
20 PACKET (EA) ORAL ONCE
Qty: 0 | Refills: 0 | Status: COMPLETED | OUTPATIENT
Start: 2017-02-28 | End: 2017-02-28

## 2017-02-28 RX ORDER — HEPARIN SODIUM 5000 [USP'U]/ML
1100 INJECTION INTRAVENOUS; SUBCUTANEOUS
Qty: 25000 | Refills: 0 | Status: DISCONTINUED | OUTPATIENT
Start: 2017-02-28 | End: 2017-03-01

## 2017-02-28 RX ORDER — SODIUM,POTASSIUM PHOSPHATES 278-250MG
1 POWDER IN PACKET (EA) ORAL
Qty: 0 | Refills: 0 | Status: COMPLETED | OUTPATIENT
Start: 2017-02-28 | End: 2017-02-28

## 2017-02-28 RX ORDER — DOCUSATE SODIUM 100 MG
100 CAPSULE ORAL THREE TIMES A DAY
Qty: 0 | Refills: 0 | Status: DISCONTINUED | OUTPATIENT
Start: 2017-02-28 | End: 2017-03-04

## 2017-02-28 RX ORDER — METOPROLOL TARTRATE 50 MG
12.5 TABLET ORAL EVERY 6 HOURS
Qty: 0 | Refills: 0 | Status: DISCONTINUED | OUTPATIENT
Start: 2017-02-28 | End: 2017-03-04

## 2017-02-28 RX ORDER — METOPROLOL TARTRATE 50 MG
5 TABLET ORAL ONCE
Qty: 0 | Refills: 0 | Status: COMPLETED | OUTPATIENT
Start: 2017-02-28 | End: 2017-02-28

## 2017-02-28 RX ADMIN — FAMOTIDINE 20 MILLIGRAM(S): 10 INJECTION INTRAVENOUS at 18:38

## 2017-02-28 RX ADMIN — BRIMONIDINE TARTRATE 1 DROP(S): 2 SOLUTION/ DROPS OPHTHALMIC at 06:21

## 2017-02-28 RX ADMIN — Medication 1 TABLET(S): at 06:21

## 2017-02-28 RX ADMIN — Medication 100 MILLIGRAM(S): at 22:39

## 2017-02-28 RX ADMIN — DORZOLAMIDE HYDROCHLORIDE TIMOLOL MALEATE 1 DROP(S): 20; 5 SOLUTION/ DROPS OPHTHALMIC at 18:41

## 2017-02-28 RX ADMIN — Medication 12.5 MILLIGRAM(S): at 18:39

## 2017-02-28 RX ADMIN — BRIMONIDINE TARTRATE 1 DROP(S): 2 SOLUTION/ DROPS OPHTHALMIC at 22:39

## 2017-02-28 RX ADMIN — LATANOPROST 1 DROP(S): 0.05 SOLUTION/ DROPS OPHTHALMIC; TOPICAL at 22:39

## 2017-02-28 RX ADMIN — AMIODARONE HYDROCHLORIDE 400 MILLIGRAM(S): 400 TABLET ORAL at 21:42

## 2017-02-28 RX ADMIN — Medication 81 MILLIGRAM(S): at 18:37

## 2017-02-28 RX ADMIN — AMIODARONE HYDROCHLORIDE 400 MILLIGRAM(S): 400 TABLET ORAL at 06:19

## 2017-02-28 RX ADMIN — Medication 125 MILLILITER(S): at 10:40

## 2017-02-28 RX ADMIN — Medication 1 TABLET(S): at 18:38

## 2017-02-28 RX ADMIN — FAMOTIDINE 20 MILLIGRAM(S): 10 INJECTION INTRAVENOUS at 06:20

## 2017-02-28 RX ADMIN — Medication 1 TABLET(S): at 21:42

## 2017-02-28 RX ADMIN — DORZOLAMIDE HYDROCHLORIDE TIMOLOL MALEATE 1 DROP(S): 20; 5 SOLUTION/ DROPS OPHTHALMIC at 06:55

## 2017-02-28 RX ADMIN — Medication 20 MILLIEQUIVALENT(S): at 06:20

## 2017-02-28 RX ADMIN — Medication 5 MILLIGRAM(S): at 09:45
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
## 2017-03-01 LAB
ANION GAP SERPL CALC-SCNC: 4 MMOL/L — LOW (ref 9–16)
ANION GAP SERPL CALC-SCNC: 5 MMOL/L — LOW (ref 9–16)
ANION GAP SERPL CALC-SCNC: 6 MMOL/L — LOW (ref 9–16)
APTT BLD: 34.1 SEC — SIGNIFICANT CHANGE UP (ref 27.5–37.4)
APTT BLD: 34.2 SEC — SIGNIFICANT CHANGE UP (ref 27.5–37.4)
APTT BLD: 42.7 SEC — HIGH (ref 27.5–37.4)
APTT BLD: 58.9 SEC — HIGH (ref 27.5–37.4)
BUN SERPL-MCNC: 12 MG/DL — SIGNIFICANT CHANGE UP (ref 7–23)
BUN SERPL-MCNC: 13 MG/DL — SIGNIFICANT CHANGE UP (ref 7–23)
BUN SERPL-MCNC: 17 MG/DL — SIGNIFICANT CHANGE UP (ref 7–23)
CALCIUM SERPL-MCNC: 8.1 MG/DL — LOW (ref 8.5–10.5)
CALCIUM SERPL-MCNC: 9 MG/DL — SIGNIFICANT CHANGE UP (ref 8.5–10.5)
CALCIUM SERPL-MCNC: 9.2 MG/DL — SIGNIFICANT CHANGE UP (ref 8.5–10.5)
CHLORIDE SERPL-SCNC: 104 MMOL/L — SIGNIFICANT CHANGE UP (ref 96–108)
CHLORIDE SERPL-SCNC: 105 MMOL/L — SIGNIFICANT CHANGE UP (ref 96–108)
CHLORIDE SERPL-SCNC: 106 MMOL/L — SIGNIFICANT CHANGE UP (ref 96–108)
CO2 SERPL-SCNC: 29 MMOL/L — SIGNIFICANT CHANGE UP (ref 22–31)
CO2 SERPL-SCNC: 29 MMOL/L — SIGNIFICANT CHANGE UP (ref 22–31)
CO2 SERPL-SCNC: 30 MMOL/L — SIGNIFICANT CHANGE UP (ref 22–31)
CREAT SERPL-MCNC: 0.67 MG/DL — SIGNIFICANT CHANGE UP (ref 0.5–1.3)
CREAT SERPL-MCNC: 0.7 MG/DL — SIGNIFICANT CHANGE UP (ref 0.5–1.3)
CREAT SERPL-MCNC: 1.12 MG/DL — SIGNIFICANT CHANGE UP (ref 0.5–1.3)
GLUCOSE SERPL-MCNC: 101 MG/DL — HIGH (ref 70–99)
GLUCOSE SERPL-MCNC: 105 MG/DL — HIGH (ref 70–99)
GLUCOSE SERPL-MCNC: 116 MG/DL — HIGH (ref 70–99)
HCT VFR BLD CALC: 24.5 % — LOW (ref 39–50)
HCT VFR BLD CALC: 25.1 % — LOW (ref 39–50)
HCT VFR BLD CALC: 28.3 % — LOW (ref 39–50)
HGB BLD-MCNC: 8.3 G/DL — LOW (ref 13–17)
HGB BLD-MCNC: 8.4 G/DL — LOW (ref 13–17)
HGB BLD-MCNC: 9.6 G/DL — LOW (ref 13–17)
INR BLD: 1.13 — SIGNIFICANT CHANGE UP (ref 0.88–1.16)
INR BLD: 1.13 — SIGNIFICANT CHANGE UP (ref 0.88–1.16)
INR BLD: 1.19 — HIGH (ref 0.88–1.16)
INR BLD: 1.37 — HIGH (ref 0.88–1.16)
MAGNESIUM SERPL-MCNC: 2.1 MG/DL — SIGNIFICANT CHANGE UP (ref 1.6–2.4)
MCHC RBC-ENTMCNC: 24.5 PG — LOW (ref 27–34)
MCHC RBC-ENTMCNC: 24.8 PG — LOW (ref 27–34)
MCHC RBC-ENTMCNC: 28.9 PG — SIGNIFICANT CHANGE UP (ref 27–34)
MCHC RBC-ENTMCNC: 33.5 G/DL — SIGNIFICANT CHANGE UP (ref 32–36)
MCHC RBC-ENTMCNC: 33.9 G/DL — SIGNIFICANT CHANGE UP (ref 32–36)
MCHC RBC-ENTMCNC: 33.9 G/DL — SIGNIFICANT CHANGE UP (ref 32–36)
MCV RBC AUTO: 73.1 FL — LOW (ref 80–100)
MCV RBC AUTO: 73.2 FL — LOW (ref 80–100)
MCV RBC AUTO: 85.2 FL — SIGNIFICANT CHANGE UP (ref 80–100)
PHOSPHATE SERPL-MCNC: 3 MG/DL — SIGNIFICANT CHANGE UP (ref 2.5–4.5)
PHOSPHATE SERPL-MCNC: 3.5 MG/DL — SIGNIFICANT CHANGE UP (ref 2.5–4.5)
PLATELET # BLD AUTO: 109 K/UL — LOW (ref 150–400)
PLATELET # BLD AUTO: 122 K/UL — LOW (ref 150–400)
PLATELET # BLD AUTO: 132 K/UL — LOW (ref 150–400)
POTASSIUM SERPL-MCNC: 3.8 MMOL/L — SIGNIFICANT CHANGE UP (ref 3.5–5.3)
POTASSIUM SERPL-MCNC: 4 MMOL/L — SIGNIFICANT CHANGE UP (ref 3.5–5.3)
POTASSIUM SERPL-MCNC: 4.5 MMOL/L — SIGNIFICANT CHANGE UP (ref 3.5–5.3)
POTASSIUM SERPL-SCNC: 3.8 MMOL/L — SIGNIFICANT CHANGE UP (ref 3.5–5.3)
POTASSIUM SERPL-SCNC: 4 MMOL/L — SIGNIFICANT CHANGE UP (ref 3.5–5.3)
POTASSIUM SERPL-SCNC: 4.5 MMOL/L — SIGNIFICANT CHANGE UP (ref 3.5–5.3)
PROTHROM AB SERPL-ACNC: 12.6 SEC — SIGNIFICANT CHANGE UP (ref 10–13.1)
PROTHROM AB SERPL-ACNC: 12.6 SEC — SIGNIFICANT CHANGE UP (ref 10–13.1)
PROTHROM AB SERPL-ACNC: 13.2 SEC — HIGH (ref 10–13.1)
PROTHROM AB SERPL-ACNC: 15.2 SEC — HIGH (ref 10–13.1)
RBC # BLD: 3.32 M/UL — LOW (ref 4.2–5.8)
RBC # BLD: 3.35 M/UL — LOW (ref 4.2–5.8)
RBC # BLD: 3.43 M/UL — LOW (ref 4.2–5.8)
RBC # FLD: 13.3 % — SIGNIFICANT CHANGE UP (ref 10.3–16.9)
RBC # FLD: 13.4 % — SIGNIFICANT CHANGE UP (ref 10.3–16.9)
RBC # FLD: 13.4 % — SIGNIFICANT CHANGE UP (ref 10.3–16.9)
SODIUM SERPL-SCNC: 138 MMOL/L — SIGNIFICANT CHANGE UP (ref 135–145)
SODIUM SERPL-SCNC: 140 MMOL/L — SIGNIFICANT CHANGE UP (ref 135–145)
SODIUM SERPL-SCNC: 140 MMOL/L — SIGNIFICANT CHANGE UP (ref 135–145)
WBC # BLD: 14.1 K/UL — HIGH (ref 3.8–10.5)
WBC # BLD: 5.5 K/UL — SIGNIFICANT CHANGE UP (ref 3.8–10.5)
WBC # BLD: 6 K/UL — SIGNIFICANT CHANGE UP (ref 3.8–10.5)
WBC # FLD AUTO: 14.1 K/UL — HIGH (ref 3.8–10.5)
WBC # FLD AUTO: 5.5 K/UL — SIGNIFICANT CHANGE UP (ref 3.8–10.5)
WBC # FLD AUTO: 6 K/UL — SIGNIFICANT CHANGE UP (ref 3.8–10.5)

## 2017-03-01 PROCEDURE — 93010 ELECTROCARDIOGRAM REPORT: CPT

## 2017-03-01 PROCEDURE — 71010: CPT | Mod: 26

## 2017-03-01 RX ORDER — MAGNESIUM OXIDE 400 MG ORAL TABLET 241.3 MG
800 TABLET ORAL ONCE
Qty: 0 | Refills: 0 | Status: COMPLETED | OUTPATIENT
Start: 2017-03-01 | End: 2017-03-01

## 2017-03-01 RX ORDER — ACETAMINOPHEN 500 MG
650 TABLET ORAL EVERY 6 HOURS
Qty: 0 | Refills: 0 | Status: DISCONTINUED | OUTPATIENT
Start: 2017-03-01 | End: 2017-03-04

## 2017-03-01 RX ORDER — HEPARIN SODIUM 5000 [USP'U]/ML
1300 INJECTION INTRAVENOUS; SUBCUTANEOUS
Qty: 25000 | Refills: 0 | Status: DISCONTINUED | OUTPATIENT
Start: 2017-03-01 | End: 2017-03-02

## 2017-03-01 RX ORDER — WARFARIN SODIUM 2.5 MG/1
2 TABLET ORAL
Qty: 0 | Refills: 0 | Status: COMPLETED | OUTPATIENT
Start: 2017-03-01 | End: 2017-03-01

## 2017-03-01 RX ORDER — WARFARIN SODIUM 2.5 MG/1
3 TABLET ORAL ONCE
Qty: 0 | Refills: 0 | Status: COMPLETED | OUTPATIENT
Start: 2017-03-01 | End: 2017-03-01

## 2017-03-01 RX ORDER — AMIODARONE HYDROCHLORIDE 400 MG/1
200 TABLET ORAL DAILY
Qty: 0 | Refills: 0 | Status: DISCONTINUED | OUTPATIENT
Start: 2017-03-03 | End: 2017-03-04

## 2017-03-01 RX ORDER — POLYETHYLENE GLYCOL 3350 17 G/17G
17 POWDER, FOR SOLUTION ORAL DAILY
Qty: 0 | Refills: 0 | Status: DISCONTINUED | OUTPATIENT
Start: 2017-03-01 | End: 2017-03-04

## 2017-03-01 RX ORDER — POTASSIUM CHLORIDE 20 MEQ
40 PACKET (EA) ORAL ONCE
Qty: 0 | Refills: 0 | Status: COMPLETED | OUTPATIENT
Start: 2017-03-01 | End: 2017-03-01

## 2017-03-01 RX ORDER — SODIUM CHLORIDE 9 MG/ML
3 INJECTION INTRAMUSCULAR; INTRAVENOUS; SUBCUTANEOUS EVERY 8 HOURS
Qty: 0 | Refills: 0 | Status: DISCONTINUED | OUTPATIENT
Start: 2017-03-01 | End: 2017-03-04

## 2017-03-01 RX ORDER — POTASSIUM CHLORIDE 20 MEQ
20 PACKET (EA) ORAL ONCE
Qty: 0 | Refills: 0 | Status: DISCONTINUED | OUTPATIENT
Start: 2017-03-01 | End: 2017-03-01

## 2017-03-01 RX ADMIN — Medication 12.5 MILLIGRAM(S): at 18:31

## 2017-03-01 RX ADMIN — Medication 40 MILLIEQUIVALENT(S): at 05:48

## 2017-03-01 RX ADMIN — SODIUM CHLORIDE 3 MILLILITER(S): 9 INJECTION INTRAMUSCULAR; INTRAVENOUS; SUBCUTANEOUS at 15:39

## 2017-03-01 RX ADMIN — MAGNESIUM OXIDE 400 MG ORAL TABLET 800 MILLIGRAM(S): 241.3 TABLET ORAL at 05:47

## 2017-03-01 RX ADMIN — Medication 12.5 MILLIGRAM(S): at 12:11

## 2017-03-01 RX ADMIN — BRIMONIDINE TARTRATE 1 DROP(S): 2 SOLUTION/ DROPS OPHTHALMIC at 21:05

## 2017-03-01 RX ADMIN — Medication 12.5 MILLIGRAM(S): at 00:34

## 2017-03-01 RX ADMIN — DORZOLAMIDE HYDROCHLORIDE TIMOLOL MALEATE 1 DROP(S): 20; 5 SOLUTION/ DROPS OPHTHALMIC at 05:52

## 2017-03-01 RX ADMIN — Medication 81 MILLIGRAM(S): at 12:11

## 2017-03-01 RX ADMIN — FAMOTIDINE 20 MILLIGRAM(S): 10 INJECTION INTRAVENOUS at 18:35

## 2017-03-01 RX ADMIN — AMIODARONE HYDROCHLORIDE 400 MILLIGRAM(S): 400 TABLET ORAL at 05:48

## 2017-03-01 RX ADMIN — Medication 650 MILLIGRAM(S): at 15:51

## 2017-03-01 RX ADMIN — DORZOLAMIDE HYDROCHLORIDE TIMOLOL MALEATE 1 DROP(S): 20; 5 SOLUTION/ DROPS OPHTHALMIC at 21:05

## 2017-03-01 RX ADMIN — Medication 100 MILLIGRAM(S): at 05:52

## 2017-03-01 RX ADMIN — AMIODARONE HYDROCHLORIDE 400 MILLIGRAM(S): 400 TABLET ORAL at 14:28

## 2017-03-01 RX ADMIN — HEPARIN SODIUM 13 UNIT(S)/HR: 5000 INJECTION INTRAVENOUS; SUBCUTANEOUS at 18:31

## 2017-03-01 RX ADMIN — Medication 12.5 MILLIGRAM(S): at 05:48

## 2017-03-01 RX ADMIN — SODIUM CHLORIDE 3 MILLILITER(S): 9 INJECTION INTRAMUSCULAR; INTRAVENOUS; SUBCUTANEOUS at 21:06

## 2017-03-01 RX ADMIN — WARFARIN SODIUM 3 MILLIGRAM(S): 2.5 TABLET ORAL at 09:54

## 2017-03-01 RX ADMIN — BRIMONIDINE TARTRATE 1 DROP(S): 2 SOLUTION/ DROPS OPHTHALMIC at 15:39

## 2017-03-01 RX ADMIN — AMIODARONE HYDROCHLORIDE 400 MILLIGRAM(S): 400 TABLET ORAL at 21:05

## 2017-03-01 RX ADMIN — BRIMONIDINE TARTRATE 1 DROP(S): 2 SOLUTION/ DROPS OPHTHALMIC at 05:51

## 2017-03-01 RX ADMIN — WARFARIN SODIUM 2 MILLIGRAM(S): 2.5 TABLET ORAL at 21:05

## 2017-03-01 RX ADMIN — FAMOTIDINE 20 MILLIGRAM(S): 10 INJECTION INTRAVENOUS at 05:48

## 2017-03-01 RX ADMIN — LATANOPROST 1 DROP(S): 0.05 SOLUTION/ DROPS OPHTHALMIC; TOPICAL at 22:51

## 2017-03-01 NOTE — PROGRESS NOTE ADULT - SUBJECTIVE AND OBJECTIVE BOX
Patient discussed on morning rounds with Dr. Jose    Operation / Date: miniAVR 2/24    SUBJECTIVE ASSESSMENT:  72y Male states that he has incisional pain that is relieve with pain medication. no acute issues overnight. pt denies chest pain, HA, dizziness, N/V/C/D, palpitations, SOB.        Vital Signs Last 24 Hrs  T(C): 36.7, Max: 37 (02-28 @ 22:24)  T(F): 98.1, Max: 98.6 (02-28 @ 22:24)  HR: 64 (64 - 78)  BP: 126/89 (105/57 - 128/67)  BP(mean): 100 (74 - 100)  RR: 16 (12 - 24)  SpO2: 97% (96% - 98%)  I&O's Detail  I & Os for 24h ending 01 Mar 2017 07:00  =============================================  IN:    Albumin 5%  - 250 mL: 250 ml    Oral Fluid: 195 ml    heparin Infusion: 128 ml    heparin Infusion: 102 ml    sodium chloride 0.45%: 40 ml    Total IN: 715 ml  ---------------------------------------------  OUT:    Voided: 910 ml    Chest Tube: 220 ml    Total OUT: 1130 ml  ---------------------------------------------  Total NET: -415 ml    I & Os for current day (as of 01 Mar 2017 21:06)  =============================================  IN:    Oral Fluid: 195 ml    heparin Infusion: 117 ml    heparin Infusion: 36 ml    Total IN: 348 ml  ---------------------------------------------  OUT:    Voided: 250 ml    Chest Tube: 60 ml    Total OUT: 310 ml  ---------------------------------------------  Total NET: 38 ml      CHEST TUBE:  No  DAVID DRAIN:  Yesx1  EPICARDIAL WIRES: Yes  TIE DOWNS: Yes  RUIZ: No    PHYSICAL EXAM:    General: well appearing male in NAD    Neurological:A&OX3, no focal deficits    Cardiovascular: RRR, normal S1S2, no murmur appreciated    Respiratory: CTAB, no wheezes/rales/rhonchi    Gastrointestinal: soft, NT/ND    Extremities: WWP, no edema b/l, good color    Vascular: 2+peripheral pulses b/l    Incision Sites: mini-sternotomy incision C/D/I with retention sutures intact    LABS:                        8.4    6.0   )-----------( 132      ( 01 Mar 2017 08:44 )             25.1       COUMADIN:  Yes for PO afib, started 3mg AM and 2mg PM    PT/INR - ( 01 Mar 2017 15:32 )   PT: 12.6 sec;   INR: 1.13          PTT - ( 01 Mar 2017 15:32 )  PTT:58.9 sec    01 Mar 2017 08:44    140    |  106    |  12     ----------------------------<  105    4.0     |  29     |  0.67     Ca    9.2        01 Mar 2017 08:44  Phos  3.5       01 Mar 2017 03:23  Mg     2.1       01 Mar 2017 08:44            MEDICATIONS  (STANDING):  famotidine    Tablet 20milliGRAM(s) Oral two times a day  aspirin enteric coated 81milliGRAM(s) Oral daily  dorzolamide 2%/timolol 0.5% Ophthalmic Solution 1Drop(s) Both EYES two times a day  brimonidine 0.2% Ophthalmic Solution 1Drop(s) Both EYES three times a day  latanoprost 0.005% Ophthalmic Solution 1Drop(s) Both EYES at bedtime  amiodarone    Tablet 400milliGRAM(s) Oral every 8 hours  metoprolol 12.5milliGRAM(s) Oral every 6 hours  docusate sodium 100milliGRAM(s) Oral three times a day  senna 2Tablet(s) Oral at bedtime  sodium chloride 0.9% lock flush 3milliLiter(s) IV Push every 8 hours  heparin  Infusion 1300Unit(s)/Hr IV Continuous <Continuous>  warfarin 2milliGRAM(s) Oral <User Schedule>    MEDICATIONS  (PRN):  oxyCODONE  5 mG/acetaminophen 325 mG 1Tablet(s) Oral every 4 hours PRN Moderate Pain (4 - 6)  zaleplon 5milliGRAM(s) Oral at bedtime PRN Insomnia  polyethylene glycol 3350 17Gram(s) Oral daily PRN Constipation  acetaminophen   Tablet 650milliGRAM(s) Oral every 6 hours PRN mild pain        RADIOLOGY & ADDITIONAL TESTS:  EXAM:  XR CHEST 1 VIEW PORT IMMEDIATE                           PROCEDURE DATE:  02/27/2017                 INTERPRETATION:       XR CHEST 1 VIEW PORT IMMEDIATE dated 2/27/2017 5:29 PM    INDICATION: 72 years-old Male with s/p CT removal.    PRIOR STUDIES: X-ray chest 2/27/2017 5:29 AM    FINDINGS: The patient is a status post removal of the left chest tube.   There is no pneumothorax. The right chest tube is present. There is   atelectasis and or residual left pleural effusion. No change in   cardiomediastinal silhouette. Sternotomy wires and multiple surgical   clips are noted overlying mid mediastinum. There is degenerative change   of the left glenohumeral joint.      IMPRESSION:  Left basilar atelectasis/effusion. No pneumothorax.

## 2017-03-01 NOTE — DIETITIAN INITIAL EVALUATION ADULT. - ENERGY NEEDS
IBW 63.9Kg  %%  BMI 31.2    Utilized IBW to calculate needs, pt >120% of IBW. Kcal adjusted for geriatric and increased pro for post-op. Fluids per MD 2/2 CHF.

## 2017-03-01 NOTE — PROGRESS NOTE ADULT - ASSESSMENT
This is a 72yMale Creole speaking w/ PMHX HTN and HLD presented for elective mini AVR for severe AI/AS on 2/24 now POD#5 and transferred from ICU. OR was uneventful. ICU course patient experienced sinu bradycardia requiring intermittent pacing and given 1U PRBC. Course was complicated by post-op AFIB started on amio and lopressor. EPS consulted s/p DCCV converted to NSR.    #NEURO: pain management   -c/w pain management with percocets PRN and tylenol PRN    #CVS: miniAVR 2/24 POD#5 c/b afib s/p DCCV converted to NSR, HTN/HLD  -continue to monitor HR/BP  -c/w amiodarone and uptitrate lopressor  -now in NSR  -c/w heparin gtt to bridge to coumadin, PTT therapeutic at 1300U/hr  -started 3mg coumadin this AM with 2mg this PM, f/u INR AM    #PULM: left basilar atelectasis vs effusion  -encourage I/S use and ambulation  -f/u CXR AM  -CXR this AM revealed stable left basilar atelectasis vs effusion    #GI: no acute issues  -c/w bowel regimen  -c/w GI PPX: famotidine  -c/w PO diet    #: no acute issues  -continue to monitor renal function and I/Os  -normal BUN/Cr function: 12/0.67    #ENDO: no acute issues  -hga1c: 4.9%  -TSH: 0.692  -no acute issues    #HEME: no acute issues  -c/w DVT ppx: SCDs    #ID: no acute issues  -continue to monitor for SIRS/sepsis    Disposition: home pending medical improvement

## 2017-03-01 NOTE — DIETITIAN INITIAL EVALUATION ADULT. - OTHER INFO
71y/o M s/p mini AVR. Pt seen in bed. He reports a good appetite and states everything is normal. Consumed 75% of breakfast tray per CNA. Denies N/V/D/C, pain, and mechanical issues with po intake. Last BM today per report. PTA pt reports following a heart healthy diet; questionable compliance as pt does not elaborate on diet and does not participate in edu. Verbal and written education provided for CHF, Dash/TLC; expect poor-fair compliance. Will follow and reinforce edu.

## 2017-03-01 NOTE — DIETITIAN INITIAL EVALUATION ADULT. - NS AS NUTRI INTERV ED CONTENT
Nutrition relationship to health/disease/Survival information/Recommended modifications/Purpose of the nutrition education/Priority modifications

## 2017-03-02 LAB
ANION GAP SERPL CALC-SCNC: 9 MMOL/L — SIGNIFICANT CHANGE UP (ref 9–16)
APTT BLD: 44.7 SEC — HIGH (ref 27.5–37.4)
APTT BLD: 49.3 SEC — HIGH (ref 27.5–37.4)
BUN SERPL-MCNC: 12 MG/DL — SIGNIFICANT CHANGE UP (ref 7–23)
CALCIUM SERPL-MCNC: 8.8 MG/DL — SIGNIFICANT CHANGE UP (ref 8.5–10.5)
CHLORIDE SERPL-SCNC: 105 MMOL/L — SIGNIFICANT CHANGE UP (ref 96–108)
CO2 SERPL-SCNC: 26 MMOL/L — SIGNIFICANT CHANGE UP (ref 22–31)
CREAT SERPL-MCNC: 0.61 MG/DL — SIGNIFICANT CHANGE UP (ref 0.5–1.3)
GLUCOSE SERPL-MCNC: 92 MG/DL — SIGNIFICANT CHANGE UP (ref 70–99)
HCT VFR BLD CALC: 24.8 % — LOW (ref 39–50)
HCT VFR BLD CALC: 26.6 % — LOW (ref 39–50)
HGB BLD-MCNC: 8.2 G/DL — LOW (ref 13–17)
HGB BLD-MCNC: 8.7 G/DL — LOW (ref 13–17)
INR BLD: 1.2 — HIGH (ref 0.88–1.16)
INR BLD: 1.21 — HIGH (ref 0.88–1.16)
MAGNESIUM SERPL-MCNC: 2 MG/DL — SIGNIFICANT CHANGE UP (ref 1.6–2.4)
MCHC RBC-ENTMCNC: 24.6 PG — LOW (ref 27–34)
MCHC RBC-ENTMCNC: 24.8 PG — LOW (ref 27–34)
MCHC RBC-ENTMCNC: 32.7 G/DL — SIGNIFICANT CHANGE UP (ref 32–36)
MCHC RBC-ENTMCNC: 33.1 G/DL — SIGNIFICANT CHANGE UP (ref 32–36)
MCV RBC AUTO: 74.9 FL — LOW (ref 80–100)
MCV RBC AUTO: 75.4 FL — LOW (ref 80–100)
PHOSPHATE SERPL-MCNC: 3.5 MG/DL — SIGNIFICANT CHANGE UP (ref 2.5–4.5)
PLATELET # BLD AUTO: 149 K/UL — LOW (ref 150–400)
PLATELET # BLD AUTO: 176 K/UL — SIGNIFICANT CHANGE UP (ref 150–400)
POTASSIUM SERPL-MCNC: 3.6 MMOL/L — SIGNIFICANT CHANGE UP (ref 3.5–5.3)
POTASSIUM SERPL-SCNC: 3.6 MMOL/L — SIGNIFICANT CHANGE UP (ref 3.5–5.3)
PROTHROM AB SERPL-ACNC: 13.4 SEC — HIGH (ref 10–13.1)
PROTHROM AB SERPL-ACNC: 13.5 SEC — HIGH (ref 10–13.1)
RBC # BLD: 3.31 M/UL — LOW (ref 4.2–5.8)
RBC # BLD: 3.53 M/UL — LOW (ref 4.2–5.8)
RBC # FLD: 13.8 % — SIGNIFICANT CHANGE UP (ref 10.3–16.9)
RBC # FLD: 13.9 % — SIGNIFICANT CHANGE UP (ref 10.3–16.9)
SODIUM SERPL-SCNC: 140 MMOL/L — SIGNIFICANT CHANGE UP (ref 135–145)
WBC # BLD: 5.5 K/UL — SIGNIFICANT CHANGE UP (ref 3.8–10.5)
WBC # BLD: 5.7 K/UL — SIGNIFICANT CHANGE UP (ref 3.8–10.5)
WBC # FLD AUTO: 5.5 K/UL — SIGNIFICANT CHANGE UP (ref 3.8–10.5)
WBC # FLD AUTO: 5.7 K/UL — SIGNIFICANT CHANGE UP (ref 3.8–10.5)

## 2017-03-02 PROCEDURE — 71010: CPT | Mod: 26

## 2017-03-02 RX ORDER — WARFARIN SODIUM 2.5 MG/1
5 TABLET ORAL
Qty: 0 | Refills: 0 | Status: COMPLETED | OUTPATIENT
Start: 2017-03-02 | End: 2017-03-02

## 2017-03-02 RX ORDER — ENOXAPARIN SODIUM 100 MG/ML
80 INJECTION SUBCUTANEOUS
Qty: 0 | Refills: 0 | Status: DISCONTINUED | OUTPATIENT
Start: 2017-03-02 | End: 2017-03-04

## 2017-03-02 RX ORDER — POTASSIUM CHLORIDE 20 MEQ
40 PACKET (EA) ORAL ONCE
Qty: 0 | Refills: 0 | Status: COMPLETED | OUTPATIENT
Start: 2017-03-02 | End: 2017-03-02

## 2017-03-02 RX ADMIN — BRIMONIDINE TARTRATE 1 DROP(S): 2 SOLUTION/ DROPS OPHTHALMIC at 07:07

## 2017-03-02 RX ADMIN — AMIODARONE HYDROCHLORIDE 400 MILLIGRAM(S): 400 TABLET ORAL at 23:03

## 2017-03-02 RX ADMIN — Medication 40 MILLIEQUIVALENT(S): at 09:57

## 2017-03-02 RX ADMIN — SODIUM CHLORIDE 3 MILLILITER(S): 9 INJECTION INTRAMUSCULAR; INTRAVENOUS; SUBCUTANEOUS at 14:35

## 2017-03-02 RX ADMIN — ENOXAPARIN SODIUM 80 MILLIGRAM(S): 100 INJECTION SUBCUTANEOUS at 23:03

## 2017-03-02 RX ADMIN — Medication 12.5 MILLIGRAM(S): at 18:39

## 2017-03-02 RX ADMIN — AMIODARONE HYDROCHLORIDE 400 MILLIGRAM(S): 400 TABLET ORAL at 07:00

## 2017-03-02 RX ADMIN — Medication 12.5 MILLIGRAM(S): at 13:02

## 2017-03-02 RX ADMIN — BRIMONIDINE TARTRATE 1 DROP(S): 2 SOLUTION/ DROPS OPHTHALMIC at 23:03

## 2017-03-02 RX ADMIN — Medication 12.5 MILLIGRAM(S): at 23:05

## 2017-03-02 RX ADMIN — Medication 12.5 MILLIGRAM(S): at 06:49

## 2017-03-02 RX ADMIN — SODIUM CHLORIDE 3 MILLILITER(S): 9 INJECTION INTRAMUSCULAR; INTRAVENOUS; SUBCUTANEOUS at 07:04

## 2017-03-02 RX ADMIN — AMIODARONE HYDROCHLORIDE 400 MILLIGRAM(S): 400 TABLET ORAL at 14:35

## 2017-03-02 RX ADMIN — LATANOPROST 1 DROP(S): 0.05 SOLUTION/ DROPS OPHTHALMIC; TOPICAL at 23:03

## 2017-03-02 RX ADMIN — DORZOLAMIDE HYDROCHLORIDE TIMOLOL MALEATE 1 DROP(S): 20; 5 SOLUTION/ DROPS OPHTHALMIC at 18:39

## 2017-03-02 RX ADMIN — DORZOLAMIDE HYDROCHLORIDE TIMOLOL MALEATE 1 DROP(S): 20; 5 SOLUTION/ DROPS OPHTHALMIC at 07:07

## 2017-03-02 RX ADMIN — SODIUM CHLORIDE 3 MILLILITER(S): 9 INJECTION INTRAMUSCULAR; INTRAVENOUS; SUBCUTANEOUS at 23:07

## 2017-03-02 RX ADMIN — FAMOTIDINE 20 MILLIGRAM(S): 10 INJECTION INTRAVENOUS at 18:39

## 2017-03-02 RX ADMIN — FAMOTIDINE 20 MILLIGRAM(S): 10 INJECTION INTRAVENOUS at 07:00

## 2017-03-02 RX ADMIN — Medication 100 MILLIGRAM(S): at 14:35

## 2017-03-02 RX ADMIN — WARFARIN SODIUM 5 MILLIGRAM(S): 2.5 TABLET ORAL at 23:03

## 2017-03-02 RX ADMIN — Medication 12.5 MILLIGRAM(S): at 06:50

## 2017-03-02 RX ADMIN — BRIMONIDINE TARTRATE 1 DROP(S): 2 SOLUTION/ DROPS OPHTHALMIC at 14:35

## 2017-03-02 RX ADMIN — Medication 81 MILLIGRAM(S): at 13:02

## 2017-03-02 NOTE — PROGRESS NOTE ADULT - ASSESSMENT
This is a 72yMale Creole speaking w/ PMHX HTN and HLD presented for elective mini AVR for severe AI/AS on 2/24 now POD#6 and transferred from ICU. OR was uneventful. ICU course patient experienced sinu bradycardia requiring intermittent pacing and given 1U PRBC. Course was complicated by post-op AFIB started on amio and lopressor. EPS consulted s/p DCCV converted to NSR.    #NEURO: pain management   -c/w pain management with percocets PRN and tylenol PRN    #CVS: miniAVR 2/24 POD#5 c/b afib s/p DCCV converted to NSR, HTN/HLD  -continue to monitor HR/BP  -c/w amiodarone and uptitrate lopressor  -now in NSR  -c/w heparin gtt to bridge to coumadin, PTT therapeutic at 1300U/hr  -first 5mg coumadin given yesterday and will give 2nd 5mg coumadin dose tonight  -INR this morning 1.21  -still has PW in and will need to be cut before discharg    #PULM: left basilar atelectasis vs effusion  -encourage I/S use and ambulation  -f/u CXR AM  -CXR this AM revealed stable left pleural effusion  -plan to perform bedside ultrasound of lungs  -keep right emiliano in place, drained 120cc yesterday and will monitor output    #GI: no acute issues  -c/w bowel regimen  -c/w GI PPX: famotidine  -c/w PO diet    #: no acute issues  -continue to monitor renal function and I/Os  -normal BUN/Cr function:     #ENDO: no acute issues  -hga1c: 4.9%  -TSH: 0.692  -no acute issues    #HEME: PO afib  -c/w DVT ppx: SCDs  -c/w heparin gtt and coumadin  -consider lovenox bridge to coumadin    #ID: no acute issues  -continue to monitor for SIRS/sepsis    Disposition: home pending medical improvement, will need INR follow up This is a 72yMale Creole speaking w/ PMHX HTN and HLD presented for elective mini AVR for severe AI/AS on 2/24 now POD#6 and transferred from ICU. OR was uneventful. ICU course patient experienced sinu bradycardia requiring intermittent pacing and given 1U PRBC. Course was complicated by post-op AFIB started on amio and lopressor. EPS consulted s/p DCCV converted to NSR.    #NEURO: pain management   -c/w pain management with percocets PRN and tylenol PRN    #CVS: miniAVR 2/24 POD#5 c/b afib s/p DCCV converted to NSR, HTN/HLD  -continue to monitor HR/BP  -c/w amiodarone and uptitrate lopressor  -now in NSR  -c/w heparin gtt to bridge to coumadin, PTT therapeutic at 1300U/hr  -first 5mg coumadin given yesterday and will give 2nd 5mg coumadin dose tonight  -INR this morning 1.21  -still has PW in and will need to be cut before discharge    #PULM: left basilar atelectasis vs effusion  -encourage I/S use and ambulation  -f/u CXR AM  -CXR this AM revealed stable left pleural effusion  -plan to perform bedside ultrasound of lungs  -keep right emiliano in place, drained 120cc yesterday and will monitor output    #GI: no acute issues  -c/w bowel regimen  -c/w GI PPX: famotidine  -c/w PO diet    #: no acute issues  -continue to monitor renal function and I/Os  -normal BUN/Cr function: 12/0.61    #ENDO: no acute issues  -hga1c: 4.9%  -TSH: 0.692  -no acute issues    #HEME: PO afib  -c/w DVT ppx: SCDs  -c/w heparin gtt and coumadin  -consider lovenox bridge to coumadin    #ID: no acute issues  -continue to monitor for SIRS/sepsis    Disposition: home pending medical improvement, will need INR follow up

## 2017-03-02 NOTE — PROGRESS NOTE ADULT - SUBJECTIVE AND OBJECTIVE BOX
Patient discussed on morning rounds with Dr. Davis    Operation / Date: miniAVR 2/24/17    SUBJECTIVE ASSESSMENT:  72y Male now POD#6        Vital Signs Last 24 Hrs  T(C): 37, Max: 37 (03-02 @ 14:00)  T(F): 98.6, Max: 98.6 (03-02 @ 14:00)  HR: 68 (62 - 68)  BP: 124/75 (102/61 - 140/84)  BP(mean): 91 (71 - 113)  RR: 16 (16 - 16)  SpO2: 99% (92% - 99%)  I&O's Detail  I & Os for 24h ending 02 Mar 2017 07:00  =============================================  IN:    Oral Fluid: 195 ml    heparin Infusion: 117 ml    heparin Infusion: 36 ml    Total IN: 348 ml  ---------------------------------------------  OUT:    Voided: 750 ml    Chest Tube: 180 ml    Total OUT: 930 ml  ---------------------------------------------  Total NET: -582 ml    I & Os for current day (as of 02 Mar 2017 16:43)  =============================================  IN:    heparin Infusion: 104 ml    Total IN: 104 ml  ---------------------------------------------  OUT:    Voided: 570 ml    Chest Tube: 55 ml    Total OUT: 625 ml  ---------------------------------------------  Total NET: -521 ml      CHEST TUBE:  No  EMILIANO DRAIN:  Yes right emiliano draining 120cc yesterday  EPICARDIAL WIRES: Yes  TIE DOWNS: Yes  RUIZ: No    PHYSICAL EXAM:    General:     Neurological:    Cardiovascular:    Respiratory:    Gastrointestinal:    Extremities:    Vascular:    Incision Sites:    LABS:                        8.7    5.5   )-----------( 176      ( 02 Mar 2017 12:31 )             26.6       COUMADIN:  Yes for PO afib    PT/INR - ( 02 Mar 2017 06:08 )   PT: 13.5 sec;   INR: 1.21          PTT - ( 02 Mar 2017 06:08 )  PTT:49.3 sec    02 Mar 2017 06:09    140    |  105    |  12     ----------------------------<  92     3.6     |  26     |  0.61     Ca    8.8        02 Mar 2017 06:09  Phos  3.5       02 Mar 2017 06:09  Mg     2.0       02 Mar 2017 06:09            MEDICATIONS  (STANDING):  famotidine    Tablet 20milliGRAM(s) Oral two times a day  aspirin enteric coated 81milliGRAM(s) Oral daily  dorzolamide 2%/timolol 0.5% Ophthalmic Solution 1Drop(s) Both EYES two times a day  brimonidine 0.2% Ophthalmic Solution 1Drop(s) Both EYES three times a day  latanoprost 0.005% Ophthalmic Solution 1Drop(s) Both EYES at bedtime  amiodarone    Tablet 400milliGRAM(s) Oral every 8 hours  metoprolol 12.5milliGRAM(s) Oral every 6 hours  docusate sodium 100milliGRAM(s) Oral three times a day  senna 2Tablet(s) Oral at bedtime  sodium chloride 0.9% lock flush 3milliLiter(s) IV Push every 8 hours  heparin  Infusion 1300Unit(s)/Hr IV Continuous <Continuous>  warfarin 5milliGRAM(s) Oral <User Schedule>    MEDICATIONS  (PRN):  oxyCODONE  5 mG/acetaminophen 325 mG 1Tablet(s) Oral every 4 hours PRN Moderate Pain (4 - 6)  zaleplon 5milliGRAM(s) Oral at bedtime PRN Insomnia  polyethylene glycol 3350 17Gram(s) Oral daily PRN Constipation  acetaminophen   Tablet 650milliGRAM(s) Oral every 6 hours PRN mild pain        RADIOLOGY & ADDITIONAL TESTS:  EXAM:  XR CHEST 1 VIEW PORT ROUTINE                           PROCEDURE DATE:  03/02/2017      INTERPRETATION:  Portable Chest X-Ray dated 3/2/2017 7:11 AM    Indication: s/p mini AVR    An AP portable view of the chest is compared to 3/1/2017. No significant   interval change is noted in the left pleural effusion, adjacent left   lower lobe atelectasis, and cardiomegaly. There may be a trace right   pleural effusion. Post surgical changes are seen in the mediastinum.   Aortic valve replacement.    IMPRESSION:  No significant interval change. Patient discussed on morning rounds with Dr. Davis    Operation / Date: miniAVR 2/24/17    SUBJECTIVE ASSESSMENT:  72y Male now POD#6 states he feels tired after walking but has no other complaints. no acute issues overnight. pt denies HA, SOB, chest pain, N/V/C/D. offered pt creole  but states he speak english well enough to understand instructions and questions.        Vital Signs Last 24 Hrs  T(C): 37, Max: 37 (03-02 @ 14:00)  T(F): 98.6, Max: 98.6 (03-02 @ 14:00)  HR: 68 (62 - 68)  BP: 124/75 (102/61 - 140/84)  BP(mean): 91 (71 - 113)  RR: 16 (16 - 16)  SpO2: 99% (92% - 99%)  I&O's Detail  I & Os for 24h ending 02 Mar 2017 07:00  =============================================  IN:    Oral Fluid: 195 ml    heparin Infusion: 117 ml    heparin Infusion: 36 ml    Total IN: 348 ml  ---------------------------------------------  OUT:    Voided: 750 ml    Chest Tube: 180 ml    Total OUT: 930 ml  ---------------------------------------------  Total NET: -582 ml    I & Os for current day (as of 02 Mar 2017 16:43)  =============================================  IN:    heparin Infusion: 104 ml    Total IN: 104 ml  ---------------------------------------------  OUT:    Voided: 570 ml    Chest Tube: 55 ml    Total OUT: 625 ml  ---------------------------------------------  Total NET: -521 ml      CHEST TUBE:  No  EMILIANO DRAIN:  Yes right emiliano draining 120cc yesterday  EPICARDIAL WIRES: Yes  TIE DOWNS: Yes  RUIZ: No    PHYSICAL EXAM:    General: well appearing m conversing in full sentences in NAD    Neurological: A&OX3 no focal deficits    Cardiovascular: RRR, normal S1S2, no murmur appreciated    Respiratory: CTAB, no wheezes/rales/rhonchi    Gastrointestinal: soft, NT/ND    Extremities: WWP, no edema b/l, good color    Vascular: 2+ peripheral pulses    Incision Sites: ministernotomy incison C/D/I - no active bleeding or purulence    LABS:                        8.7    5.5   )-----------( 176      ( 02 Mar 2017 12:31 )             26.6       COUMADIN:  Yes for PO afib    PT/INR - ( 02 Mar 2017 06:08 )   PT: 13.5 sec;   INR: 1.21          PTT - ( 02 Mar 2017 06:08 )  PTT:49.3 sec    02 Mar 2017 06:09    140    |  105    |  12     ----------------------------<  92     3.6     |  26     |  0.61     Ca    8.8        02 Mar 2017 06:09  Phos  3.5       02 Mar 2017 06:09  Mg     2.0       02 Mar 2017 06:09            MEDICATIONS  (STANDING):  famotidine    Tablet 20milliGRAM(s) Oral two times a day  aspirin enteric coated 81milliGRAM(s) Oral daily  dorzolamide 2%/timolol 0.5% Ophthalmic Solution 1Drop(s) Both EYES two times a day  brimonidine 0.2% Ophthalmic Solution 1Drop(s) Both EYES three times a day  latanoprost 0.005% Ophthalmic Solution 1Drop(s) Both EYES at bedtime  amiodarone    Tablet 400milliGRAM(s) Oral every 8 hours  metoprolol 12.5milliGRAM(s) Oral every 6 hours  docusate sodium 100milliGRAM(s) Oral three times a day  senna 2Tablet(s) Oral at bedtime  sodium chloride 0.9% lock flush 3milliLiter(s) IV Push every 8 hours  heparin  Infusion 1300Unit(s)/Hr IV Continuous <Continuous>  warfarin 5milliGRAM(s) Oral <User Schedule>    MEDICATIONS  (PRN):  oxyCODONE  5 mG/acetaminophen 325 mG 1Tablet(s) Oral every 4 hours PRN Moderate Pain (4 - 6)  zaleplon 5milliGRAM(s) Oral at bedtime PRN Insomnia  polyethylene glycol 3350 17Gram(s) Oral daily PRN Constipation  acetaminophen   Tablet 650milliGRAM(s) Oral every 6 hours PRN mild pain        RADIOLOGY & ADDITIONAL TESTS:  EXAM:  XR CHEST 1 VIEW PORT ROUTINE                           PROCEDURE DATE:  03/02/2017      INTERPRETATION:  Portable Chest X-Ray dated 3/2/2017 7:11 AM    Indication: s/p mini AVR    An AP portable view of the chest is compared to 3/1/2017. No significant   interval change is noted in the left pleural effusion, adjacent left   lower lobe atelectasis, and cardiomegaly. There may be a trace right   pleural effusion. Post surgical changes are seen in the mediastinum.   Aortic valve replacement.    IMPRESSION:  No significant interval change.

## 2017-03-03 LAB
ALBUMIN SERPL ELPH-MCNC: 3.3 G/DL — LOW (ref 3.4–5)
ALP SERPL-CCNC: 82 U/L — SIGNIFICANT CHANGE UP (ref 40–120)
ALT FLD-CCNC: 114 U/L — HIGH (ref 12–42)
ANION GAP SERPL CALC-SCNC: 7 MMOL/L — LOW (ref 9–16)
ANION GAP SERPL CALC-SCNC: 7 MMOL/L — LOW (ref 9–16)
APTT BLD: 31.2 SEC — SIGNIFICANT CHANGE UP (ref 27.5–37.4)
APTT BLD: 32.3 SEC — SIGNIFICANT CHANGE UP (ref 27.5–37.4)
AST SERPL-CCNC: 71 U/L — HIGH (ref 15–37)
BILIRUB SERPL-MCNC: 0.9 MG/DL — SIGNIFICANT CHANGE UP (ref 0.2–1.2)
BUN SERPL-MCNC: 10 MG/DL — SIGNIFICANT CHANGE UP (ref 7–23)
BUN SERPL-MCNC: 11 MG/DL — SIGNIFICANT CHANGE UP (ref 7–23)
CALCIUM SERPL-MCNC: 8.7 MG/DL — SIGNIFICANT CHANGE UP (ref 8.5–10.5)
CALCIUM SERPL-MCNC: 8.9 MG/DL — SIGNIFICANT CHANGE UP (ref 8.5–10.5)
CHLORIDE SERPL-SCNC: 104 MMOL/L — SIGNIFICANT CHANGE UP (ref 96–108)
CHLORIDE SERPL-SCNC: 105 MMOL/L — SIGNIFICANT CHANGE UP (ref 96–108)
CO2 SERPL-SCNC: 27 MMOL/L — SIGNIFICANT CHANGE UP (ref 22–31)
CO2 SERPL-SCNC: 28 MMOL/L — SIGNIFICANT CHANGE UP (ref 22–31)
CREAT SERPL-MCNC: 0.65 MG/DL — SIGNIFICANT CHANGE UP (ref 0.5–1.3)
CREAT SERPL-MCNC: 0.73 MG/DL — SIGNIFICANT CHANGE UP (ref 0.5–1.3)
GLUCOSE SERPL-MCNC: 101 MG/DL — HIGH (ref 70–99)
GLUCOSE SERPL-MCNC: 106 MG/DL — HIGH (ref 70–99)
HCT VFR BLD CALC: 26.3 % — LOW (ref 39–50)
HCT VFR BLD CALC: 26.9 % — LOW (ref 39–50)
HGB BLD-MCNC: 8.3 G/DL — LOW (ref 13–17)
HGB BLD-MCNC: 8.9 G/DL — LOW (ref 13–17)
INR BLD: 1.24 — HIGH (ref 0.88–1.16)
INR BLD: 1.31 — HIGH (ref 0.88–1.16)
MAGNESIUM SERPL-MCNC: 2 MG/DL — SIGNIFICANT CHANGE UP (ref 1.6–2.4)
MCHC RBC-ENTMCNC: 23.7 PG — LOW (ref 27–34)
MCHC RBC-ENTMCNC: 24.3 PG — LOW (ref 27–34)
MCHC RBC-ENTMCNC: 31.6 G/DL — LOW (ref 32–36)
MCHC RBC-ENTMCNC: 33.1 G/DL — SIGNIFICANT CHANGE UP (ref 32–36)
MCV RBC AUTO: 73.5 FL — LOW (ref 80–100)
MCV RBC AUTO: 75.1 FL — LOW (ref 80–100)
PHOSPHATE SERPL-MCNC: 3.1 MG/DL — SIGNIFICANT CHANGE UP (ref 2.5–4.5)
PLATELET # BLD AUTO: 200 K/UL — SIGNIFICANT CHANGE UP (ref 150–400)
PLATELET # BLD AUTO: 244 K/UL — SIGNIFICANT CHANGE UP (ref 150–400)
POTASSIUM SERPL-MCNC: 3.9 MMOL/L — SIGNIFICANT CHANGE UP (ref 3.5–5.3)
POTASSIUM SERPL-MCNC: 4.3 MMOL/L — SIGNIFICANT CHANGE UP (ref 3.5–5.3)
POTASSIUM SERPL-SCNC: 3.9 MMOL/L — SIGNIFICANT CHANGE UP (ref 3.5–5.3)
POTASSIUM SERPL-SCNC: 4.3 MMOL/L — SIGNIFICANT CHANGE UP (ref 3.5–5.3)
PROT SERPL-MCNC: 6.7 G/DL — SIGNIFICANT CHANGE UP (ref 6.4–8.2)
PROTHROM AB SERPL-ACNC: 13.8 SEC — HIGH (ref 10–13.1)
PROTHROM AB SERPL-ACNC: 14.6 SEC — HIGH (ref 10–13.1)
RBC # BLD: 3.5 M/UL — LOW (ref 4.2–5.8)
RBC # BLD: 3.66 M/UL — LOW (ref 4.2–5.8)
RBC # FLD: 13.6 % — SIGNIFICANT CHANGE UP (ref 10.3–16.9)
RBC # FLD: 14.1 % — SIGNIFICANT CHANGE UP (ref 10.3–16.9)
SODIUM SERPL-SCNC: 139 MMOL/L — SIGNIFICANT CHANGE UP (ref 135–145)
SODIUM SERPL-SCNC: 139 MMOL/L — SIGNIFICANT CHANGE UP (ref 135–145)
WBC # BLD: 5.4 K/UL — SIGNIFICANT CHANGE UP (ref 3.8–10.5)
WBC # BLD: 5.8 K/UL — SIGNIFICANT CHANGE UP (ref 3.8–10.5)
WBC # FLD AUTO: 5.4 K/UL — SIGNIFICANT CHANGE UP (ref 3.8–10.5)
WBC # FLD AUTO: 5.8 K/UL — SIGNIFICANT CHANGE UP (ref 3.8–10.5)

## 2017-03-03 PROCEDURE — 93306 TTE W/DOPPLER COMPLETE: CPT | Mod: 26

## 2017-03-03 PROCEDURE — 71010: CPT | Mod: 26,77

## 2017-03-03 PROCEDURE — 99233 SBSQ HOSP IP/OBS HIGH 50: CPT

## 2017-03-03 PROCEDURE — 71010: CPT | Mod: 26

## 2017-03-03 RX ORDER — WARFARIN SODIUM 2.5 MG/1
5 TABLET ORAL ONCE
Qty: 0 | Refills: 0 | Status: COMPLETED | OUTPATIENT
Start: 2017-03-03 | End: 2017-03-03

## 2017-03-03 RX ORDER — ONDANSETRON 8 MG/1
4 TABLET, FILM COATED ORAL ONCE
Qty: 0 | Refills: 0 | Status: COMPLETED | OUTPATIENT
Start: 2017-03-03 | End: 2017-03-03

## 2017-03-03 RX ORDER — POTASSIUM CHLORIDE 20 MEQ
20 PACKET (EA) ORAL ONCE
Qty: 0 | Refills: 0 | Status: COMPLETED | OUTPATIENT
Start: 2017-03-03 | End: 2017-03-03

## 2017-03-03 RX ADMIN — Medication 81 MILLIGRAM(S): at 11:00

## 2017-03-03 RX ADMIN — ENOXAPARIN SODIUM 80 MILLIGRAM(S): 100 INJECTION SUBCUTANEOUS at 06:44

## 2017-03-03 RX ADMIN — Medication 20 MILLIEQUIVALENT(S): at 09:56

## 2017-03-03 RX ADMIN — DORZOLAMIDE HYDROCHLORIDE TIMOLOL MALEATE 1 DROP(S): 20; 5 SOLUTION/ DROPS OPHTHALMIC at 05:09

## 2017-03-03 RX ADMIN — SENNA PLUS 2 TABLET(S): 8.6 TABLET ORAL at 23:52

## 2017-03-03 RX ADMIN — SODIUM CHLORIDE 3 MILLILITER(S): 9 INJECTION INTRAMUSCULAR; INTRAVENOUS; SUBCUTANEOUS at 22:53

## 2017-03-03 RX ADMIN — FAMOTIDINE 20 MILLIGRAM(S): 10 INJECTION INTRAVENOUS at 17:57

## 2017-03-03 RX ADMIN — BRIMONIDINE TARTRATE 1 DROP(S): 2 SOLUTION/ DROPS OPHTHALMIC at 14:15

## 2017-03-03 RX ADMIN — WARFARIN SODIUM 5 MILLIGRAM(S): 2.5 TABLET ORAL at 22:08

## 2017-03-03 RX ADMIN — BRIMONIDINE TARTRATE 1 DROP(S): 2 SOLUTION/ DROPS OPHTHALMIC at 22:51

## 2017-03-03 RX ADMIN — Medication 650 MILLIGRAM(S): at 05:06

## 2017-03-03 RX ADMIN — SODIUM CHLORIDE 3 MILLILITER(S): 9 INJECTION INTRAMUSCULAR; INTRAVENOUS; SUBCUTANEOUS at 14:13

## 2017-03-03 RX ADMIN — LATANOPROST 1 DROP(S): 0.05 SOLUTION/ DROPS OPHTHALMIC; TOPICAL at 22:52

## 2017-03-03 RX ADMIN — AMIODARONE HYDROCHLORIDE 200 MILLIGRAM(S): 400 TABLET ORAL at 05:06

## 2017-03-03 RX ADMIN — BRIMONIDINE TARTRATE 1 DROP(S): 2 SOLUTION/ DROPS OPHTHALMIC at 05:09

## 2017-03-03 RX ADMIN — ENOXAPARIN SODIUM 80 MILLIGRAM(S): 100 INJECTION SUBCUTANEOUS at 17:58

## 2017-03-03 RX ADMIN — ONDANSETRON 4 MILLIGRAM(S): 8 TABLET, FILM COATED ORAL at 05:24

## 2017-03-03 RX ADMIN — SODIUM CHLORIDE 3 MILLILITER(S): 9 INJECTION INTRAMUSCULAR; INTRAVENOUS; SUBCUTANEOUS at 05:07

## 2017-03-03 RX ADMIN — Medication 100 MILLIGRAM(S): at 17:57

## 2017-03-03 RX ADMIN — Medication 12.5 MILLIGRAM(S): at 17:58

## 2017-03-03 RX ADMIN — Medication 12.5 MILLIGRAM(S): at 05:06

## 2017-03-03 RX ADMIN — DORZOLAMIDE HYDROCHLORIDE TIMOLOL MALEATE 1 DROP(S): 20; 5 SOLUTION/ DROPS OPHTHALMIC at 17:58

## 2017-03-03 RX ADMIN — FAMOTIDINE 20 MILLIGRAM(S): 10 INJECTION INTRAVENOUS at 05:07

## 2017-03-03 NOTE — PROGRESS NOTE ADULT - SUBJECTIVE AND OBJECTIVE BOX
Patient discussed on morning rounds with Dr. Jose     Operation / Date: 2/24/17: Mini AVR  post op AF with DCCV- NSR    SUBJECTIVE ASSESSMENT:  72y Male reports no SOB or chest pain today. Patient noticed a clot in his emiliano bulb this morning. Since the tube has been draining serous fluid. Since 8am 400cc has been put out. Patient states that he feels well and would still like to go home today.         Vital Signs Last 24 Hrs  T(C): 36.7, Max: 37.4 (03-03 @ 01:00)  T(F): 98, Max: 99.4 (03-03 @ 01:00)  HR: 54 (54 - 63)  BP: 93/60 (93/60 - 139/72)  BP(mean): 69 (69 - 92)  RR: 14 (14 - 18)  SpO2: 97% (97% - 100%)  I&O's Detail  I & Os for 24h ending 03 Mar 2017 07:00  =============================================  IN:    heparin Infusion: 143 ml    Total IN: 143 ml  ---------------------------------------------  OUT:    Voided: 1120 ml    Chest Tube: 145 ml    Total OUT: 1265 ml  ---------------------------------------------  Total NET: -1122 ml    I & Os for current day (as of 03 Mar 2017 17:00)  =============================================  IN:    Total IN: 0 ml  ---------------------------------------------  OUT:    Chest Tube: 230 ml    Total OUT: 230 ml  ---------------------------------------------  Total NET: -230 ml      CHEST TUBE: NO   EMILIANO DRAIN:  Yes   EPICARDIAL WIRES: No.(cut today)  TIE DOWNS: Yes + retention suture.   RUIZ: No.    PHYSICAL EXAM:    General: Aox3 in no acute distress    Neurological: No focal neuro deficit     Cardiovascular: RRR no murmurs rubs or gallops.     Respiratory: CTA bilaterally. No wheeze rhonchi or rales     Gastrointestinal: soft non tender non distended.     Extremities: WWP no peripheral edema.     Vascular: 2+ distal pulses bilaterally.     Incision Sites: Mini- sternontomy with + retention sutures at superior and inferior poles.     LABS:                        8.3    5.4   )-----------( 244      ( 03 Mar 2017 12:30 )             26.3       COUMADIN:  No.      PT/INR - ( 03 Mar 2017 12:30 )   PT: 14.6 sec;   INR: 1.31          PTT - ( 03 Mar 2017 12:30 )  PTT:31.2 sec    03 Mar 2017 12:30    139    |  104    |  11     ----------------------------<  106    4.3     |  28     |  0.65     Ca    8.7        03 Mar 2017 12:30  Phos  3.1       03 Mar 2017 06:30  Mg     2.0       03 Mar 2017 06:30    TPro  6.7    /  Alb  3.3    /  TBili  0.9    /  DBili  x      /  AST  71     /  ALT  114    /  AlkPhos  82     03 Mar 2017 06:30          MEDICATIONS  (STANDING):  famotidine    Tablet 20milliGRAM(s) Oral two times a day  aspirin enteric coated 81milliGRAM(s) Oral daily  dorzolamide 2%/timolol 0.5% Ophthalmic Solution 1Drop(s) Both EYES two times a day  brimonidine 0.2% Ophthalmic Solution 1Drop(s) Both EYES three times a day  latanoprost 0.005% Ophthalmic Solution 1Drop(s) Both EYES at bedtime  metoprolol 12.5milliGRAM(s) Oral every 6 hours  docusate sodium 100milliGRAM(s) Oral three times a day  senna 2Tablet(s) Oral at bedtime  amiodarone    Tablet 200milliGRAM(s) Oral daily  sodium chloride 0.9% lock flush 3milliLiter(s) IV Push every 8 hours  enoxaparin Injectable 80milliGRAM(s) SubCutaneous two times a day    MEDICATIONS  (PRN):  oxyCODONE  5 mG/acetaminophen 325 mG 1Tablet(s) Oral every 4 hours PRN Moderate Pain (4 - 6)  zaleplon 5milliGRAM(s) Oral at bedtime PRN Insomnia  polyethylene glycol 3350 17Gram(s) Oral daily PRN Constipation  acetaminophen   Tablet 650milliGRAM(s) Oral every 6 hours PRN mild pain        RADIOLOGY & ADDITIONAL TESTS:
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
## 2017-03-03 NOTE — PROGRESS NOTE ADULT - ASSESSMENT
This is a 72y Male Creole speaking w/ PMHX HTN and HLD presented for elective mini AVR for severe AI/AS on 2/24 now POD#6 and transferred from ICU. OR was uneventful. ICU course patient experienced sinu bradycardia requiring intermittent pacing and given 1U PRBC. Course was complicated by post-op AFIB started on amio and lopressor. EPS consulted s/p DCCV converted to NSR.    #NEURO: pain management   -c/w pain management with percocets PRN and tylenol PRN    #CVS: miniAVR 2/24 c/b afib s/p DCCV converted to NSR, HTN/HLD  -continue to monitor HR/BP  -c/w amiodarone and uptitrate lopressor  -now in NSR  -c/w heparin gtt to bridge to coumadin, PTT therapeutic at 1300U/hr  -first 5mg coumadin given yesterday and will give 2nd 5mg coumadin dose tonight  -INR this morning 1.21  -still has PW in and will need to be cut before discharge    #PULM: left basilar atelectasis vs effusion  -encourage I/S use and ambulation  -f/u CXR AM  -CXR this AM revealed stable left pleural effusion  -plan to perform bedside ultrasound of lungs  -keep right emiliano in place, drained 120cc yesterday and will monitor output    #GI: no acute issues  -c/w bowel regimen  -c/w GI PPX: famotidine  -c/w PO diet    #: no acute issues  -continue to monitor renal function and I/Os  -normal BUN/Cr function: 12/0.61    #ENDO: no acute issues  -hga1c: 4.9%  -TSH: 0.692  -no acute issues    #HEME: PO afib  -c/w DVT ppx: SCDs  -c/w heparin gtt and coumadin  -consider lovenox bridge to coumadin    #ID: no acute issues  -continue to monitor for SIRS/sepsis    Disposition: home pending medical improvement, will need INR follow up This is a 72y Male Creole speaking w/ PMHX HTN and HLD presented for elective mini AVR for severe AI/AS on 2/24 now POD#6 and transferred from ICU. OR was uneventful. ICU course patient experienced sinu bradycardia requiring intermittent pacing and given 1U PRBC. Course was complicated by post-op AFIB started on amio and lopressor. EPS consulted s/p DCCV converted to NSR.    #NEURO: pain management   -c/w pain management with percocets PRN and tylenol PRN    #CVS: miniAVR 2/24 c/b afib s/p DCCV converted to NSR, HTN/HLD  -continue to monitor HR/BP  -c/w amiodarone  - Lopressor 12.5 every 6 hours (soft pressures today)   -now in NSR  - Started on Lovenox 80mg BID today for coumadin bridge.   - 5mg Coumadin tonight.   -INR at 1.3    #PULM: emiliano still in and draining >20cc an hours. Total of 400cc since this morning.   -encourage I/S use and ambulation  -f/u CXR AM  - emiliano to stay in tonight while still draining.     #GI: no acute issues  -c/w bowel regimen  -c/w GI PPX: famotidine  -c/w PO diet    #: no acute issues  -continue to monitor renal function and I/Os  -normal BUN/Cr function: 12/0.61    #ENDO: no acute issues  -hga1c: 4.9%  -TSH: 0.692  -no acute issues    #HEME:   -c/w DVT ppx: SCDs  - Lovenox and warfarin     #ID: no acute issues  -continue to monitor for SIRS/sepsis    Disposition: home pending emiliano management

## 2017-03-03 NOTE — PROGRESS NOTE ADULT - SUBJECTIVE AND OBJECTIVE BOX
Patient discussed on morning rounds with Dr. Jose     Operation / Date: 2/24/17: Mini AVR  post op AF with DCCV- NSR    SUBJECTIVE ASSESSMENT:  72y Male         Vital Signs Last 24 Hrs  T(C): 36.7, Max: 37.4 (03-03 @ 01:00)  T(F): 98, Max: 99.4 (03-03 @ 01:00)  HR: 62 (58 - 63)  BP: 93/60 (93/60 - 139/72)  BP(mean): 69 (69 - 92)  RR: 14 (14 - 18)  SpO2: 97% (97% - 100%)  I&O's Detail  I & Os for 24h ending 03 Mar 2017 07:00  =============================================  IN:    heparin Infusion: 143 ml    Total IN: 143 ml  ---------------------------------------------  OUT:    Voided: 1120 ml    Chest Tube: 145 ml    Total OUT: 1265 ml  ---------------------------------------------  Total NET: -1122 ml    I & Os for current day (as of 03 Mar 2017 16:55)  =============================================  IN:    Total IN: 0 ml  ---------------------------------------------  OUT:    Chest Tube: 230 ml    Total OUT: 230 ml  ---------------------------------------------  Total NET: -230 ml      CHEST TUBE:  Yes/No. AIR LEAKS: Yes/No. Suction / H2O SEAL.   DAVID DRAIN:  Yes/No.  EPICARDIAL WIRES: Yes/No.  TIE DOWNS: Yes/No.  RUIZ: Yes/No.    PHYSICAL EXAM:    General:     Neurological:    Cardiovascular:    Respiratory:    Gastrointestinal:    Extremities:    Vascular:    Incision Sites:    LABS:                        8.3    5.4   )-----------( 244      ( 03 Mar 2017 12:30 )             26.3       COUMADIN:  Yes/No. REASON: .    PT/INR - ( 03 Mar 2017 12:30 )   PT: 14.6 sec;   INR: 1.31          PTT - ( 03 Mar 2017 12:30 )  PTT:31.2 sec    03 Mar 2017 12:30    139    |  104    |  11     ----------------------------<  106    4.3     |  28     |  0.65     Ca    8.7        03 Mar 2017 12:30  Phos  3.1       03 Mar 2017 06:30  Mg     2.0       03 Mar 2017 06:30    TPro  6.7    /  Alb  3.3    /  TBili  0.9    /  DBili  x      /  AST  71     /  ALT  114    /  AlkPhos  82     03 Mar 2017 06:30          MEDICATIONS  (STANDING):  famotidine    Tablet 20milliGRAM(s) Oral two times a day  aspirin enteric coated 81milliGRAM(s) Oral daily  dorzolamide 2%/timolol 0.5% Ophthalmic Solution 1Drop(s) Both EYES two times a day  brimonidine 0.2% Ophthalmic Solution 1Drop(s) Both EYES three times a day  latanoprost 0.005% Ophthalmic Solution 1Drop(s) Both EYES at bedtime  metoprolol 12.5milliGRAM(s) Oral every 6 hours  docusate sodium 100milliGRAM(s) Oral three times a day  senna 2Tablet(s) Oral at bedtime  amiodarone    Tablet 200milliGRAM(s) Oral daily  sodium chloride 0.9% lock flush 3milliLiter(s) IV Push every 8 hours  enoxaparin Injectable 80milliGRAM(s) SubCutaneous two times a day    MEDICATIONS  (PRN):  oxyCODONE  5 mG/acetaminophen 325 mG 1Tablet(s) Oral every 4 hours PRN Moderate Pain (4 - 6)  zaleplon 5milliGRAM(s) Oral at bedtime PRN Insomnia  polyethylene glycol 3350 17Gram(s) Oral daily PRN Constipation  acetaminophen   Tablet 650milliGRAM(s) Oral every 6 hours PRN mild pain        RADIOLOGY & ADDITIONAL TESTS:

## 2017-03-04 ENCOUNTER — TRANSCRIPTION ENCOUNTER (OUTPATIENT)
Age: 72
End: 2017-03-04

## 2017-03-04 VITALS — TEMPERATURE: 97 F

## 2017-03-04 LAB
ANION GAP SERPL CALC-SCNC: 4 MMOL/L — LOW (ref 9–16)
APTT BLD: 35.2 SEC — SIGNIFICANT CHANGE UP (ref 27.5–37.4)
BUN SERPL-MCNC: 10 MG/DL — SIGNIFICANT CHANGE UP (ref 7–23)
CALCIUM SERPL-MCNC: 8.8 MG/DL — SIGNIFICANT CHANGE UP (ref 8.5–10.5)
CHLORIDE SERPL-SCNC: 105 MMOL/L — SIGNIFICANT CHANGE UP (ref 96–108)
CO2 SERPL-SCNC: 30 MMOL/L — SIGNIFICANT CHANGE UP (ref 22–31)
CREAT SERPL-MCNC: 0.81 MG/DL — SIGNIFICANT CHANGE UP (ref 0.5–1.3)
GLUCOSE SERPL-MCNC: 145 MG/DL — HIGH (ref 70–99)
HCT VFR BLD CALC: 26.8 % — LOW (ref 39–50)
HGB BLD-MCNC: 8.4 G/DL — LOW (ref 13–17)
INR BLD: 1.45 — HIGH (ref 0.88–1.16)
MCHC RBC-ENTMCNC: 23.8 PG — LOW (ref 27–34)
MCHC RBC-ENTMCNC: 31.3 G/DL — LOW (ref 32–36)
MCV RBC AUTO: 75.9 FL — LOW (ref 80–100)
PLATELET # BLD AUTO: 293 K/UL — SIGNIFICANT CHANGE UP (ref 150–400)
POTASSIUM SERPL-MCNC: 4.2 MMOL/L — SIGNIFICANT CHANGE UP (ref 3.5–5.3)
POTASSIUM SERPL-SCNC: 4.2 MMOL/L — SIGNIFICANT CHANGE UP (ref 3.5–5.3)
PROTHROM AB SERPL-ACNC: 16.1 SEC — HIGH (ref 10–13.1)
RBC # BLD: 3.53 M/UL — LOW (ref 4.2–5.8)
RBC # FLD: 14.1 % — SIGNIFICANT CHANGE UP (ref 10.3–16.9)
SODIUM SERPL-SCNC: 139 MMOL/L — SIGNIFICANT CHANGE UP (ref 135–145)
WBC # BLD: 5.4 K/UL — SIGNIFICANT CHANGE UP (ref 3.8–10.5)
WBC # FLD AUTO: 5.4 K/UL — SIGNIFICANT CHANGE UP (ref 3.8–10.5)

## 2017-03-04 PROCEDURE — 36430 TRANSFUSION BLD/BLD COMPNT: CPT

## 2017-03-04 PROCEDURE — 83735 ASSAY OF MAGNESIUM: CPT

## 2017-03-04 PROCEDURE — P9016: CPT

## 2017-03-04 PROCEDURE — 93312 ECHO TRANSESOPHAGEAL: CPT

## 2017-03-04 PROCEDURE — 86850 RBC ANTIBODY SCREEN: CPT

## 2017-03-04 PROCEDURE — 85610 PROTHROMBIN TIME: CPT

## 2017-03-04 PROCEDURE — 93306 TTE W/DOPPLER COMPLETE: CPT

## 2017-03-04 PROCEDURE — 80048 BASIC METABOLIC PNL TOTAL CA: CPT

## 2017-03-04 PROCEDURE — 83605 ASSAY OF LACTIC ACID: CPT

## 2017-03-04 PROCEDURE — 86900 BLOOD TYPING SEROLOGIC ABO: CPT

## 2017-03-04 PROCEDURE — 36415 COLL VENOUS BLD VENIPUNCTURE: CPT

## 2017-03-04 PROCEDURE — C1889: CPT

## 2017-03-04 PROCEDURE — 86923 COMPATIBILITY TEST ELECTRIC: CPT

## 2017-03-04 PROCEDURE — 85027 COMPLETE CBC AUTOMATED: CPT

## 2017-03-04 PROCEDURE — P9045: CPT

## 2017-03-04 PROCEDURE — 80076 HEPATIC FUNCTION PANEL: CPT

## 2017-03-04 PROCEDURE — 84295 ASSAY OF SERUM SODIUM: CPT

## 2017-03-04 PROCEDURE — 82330 ASSAY OF CALCIUM: CPT

## 2017-03-04 PROCEDURE — 71010: CPT | Mod: 26

## 2017-03-04 PROCEDURE — 84132 ASSAY OF SERUM POTASSIUM: CPT

## 2017-03-04 PROCEDURE — 93005 ELECTROCARDIOGRAM TRACING: CPT

## 2017-03-04 PROCEDURE — 88305 TISSUE EXAM BY PATHOLOGIST: CPT

## 2017-03-04 PROCEDURE — 84100 ASSAY OF PHOSPHORUS: CPT

## 2017-03-04 PROCEDURE — 85018 HEMOGLOBIN: CPT

## 2017-03-04 PROCEDURE — 71045 X-RAY EXAM CHEST 1 VIEW: CPT

## 2017-03-04 PROCEDURE — C1769: CPT

## 2017-03-04 PROCEDURE — 85730 THROMBOPLASTIN TIME PARTIAL: CPT

## 2017-03-04 PROCEDURE — 80053 COMPREHEN METABOLIC PANEL: CPT

## 2017-03-04 PROCEDURE — 97116 GAIT TRAINING THERAPY: CPT

## 2017-03-04 PROCEDURE — 86901 BLOOD TYPING SEROLOGIC RH(D): CPT

## 2017-03-04 PROCEDURE — 82803 BLOOD GASES ANY COMBINATION: CPT

## 2017-03-04 PROCEDURE — 85025 COMPLETE CBC W/AUTO DIFF WBC: CPT

## 2017-03-04 RX ORDER — WARFARIN SODIUM 2.5 MG/1
1 TABLET ORAL
Qty: 30 | Refills: 0 | OUTPATIENT
Start: 2017-03-04 | End: 2017-04-03

## 2017-03-04 RX ORDER — ASPIRIN/CALCIUM CARB/MAGNESIUM 324 MG
1 TABLET ORAL
Qty: 30 | Refills: 0 | OUTPATIENT
Start: 2017-03-04 | End: 2017-04-03

## 2017-03-04 RX ORDER — SENNA PLUS 8.6 MG/1
2 TABLET ORAL
Qty: 20 | Refills: 0 | OUTPATIENT
Start: 2017-03-04 | End: 2017-03-14

## 2017-03-04 RX ORDER — AMIODARONE HYDROCHLORIDE 400 MG/1
1 TABLET ORAL
Qty: 30 | Refills: 0 | OUTPATIENT
Start: 2017-03-04 | End: 2017-04-03

## 2017-03-04 RX ORDER — ACETAMINOPHEN 500 MG
2 TABLET ORAL
Qty: 80 | Refills: 0 | OUTPATIENT
Start: 2017-03-04 | End: 2017-03-14

## 2017-03-04 RX ORDER — OXYCODONE HYDROCHLORIDE 5 MG/1
1 TABLET ORAL
Qty: 60 | Refills: 0 | OUTPATIENT
Start: 2017-03-04 | End: 2017-03-14

## 2017-03-04 RX ORDER — ENOXAPARIN SODIUM 100 MG/ML
80 INJECTION SUBCUTANEOUS
Qty: 1 | Refills: 0 | OUTPATIENT
Start: 2017-03-04 | End: 2017-04-03

## 2017-03-04 RX ORDER — DOCUSATE SODIUM 100 MG
1 CAPSULE ORAL
Qty: 30 | Refills: 0 | OUTPATIENT
Start: 2017-03-04 | End: 2017-03-14

## 2017-03-04 RX ORDER — POLYETHYLENE GLYCOL 3350 17 G/17G
17 POWDER, FOR SOLUTION ORAL
Qty: 170 | Refills: 0 | OUTPATIENT
Start: 2017-03-04 | End: 2017-03-14

## 2017-03-04 RX ORDER — METOPROLOL TARTRATE 50 MG
0.5 TABLET ORAL
Qty: 30 | Refills: 0 | OUTPATIENT
Start: 2017-03-04 | End: 2017-04-03

## 2017-03-04 RX ADMIN — Medication 12.5 MILLIGRAM(S): at 06:27

## 2017-03-04 RX ADMIN — SODIUM CHLORIDE 3 MILLILITER(S): 9 INJECTION INTRAMUSCULAR; INTRAVENOUS; SUBCUTANEOUS at 07:23

## 2017-03-04 RX ADMIN — BRIMONIDINE TARTRATE 1 DROP(S): 2 SOLUTION/ DROPS OPHTHALMIC at 16:11

## 2017-03-04 RX ADMIN — Medication 81 MILLIGRAM(S): at 09:11

## 2017-03-04 RX ADMIN — FAMOTIDINE 20 MILLIGRAM(S): 10 INJECTION INTRAVENOUS at 06:33

## 2017-03-04 RX ADMIN — AMIODARONE HYDROCHLORIDE 200 MILLIGRAM(S): 400 TABLET ORAL at 06:27

## 2017-03-04 RX ADMIN — ENOXAPARIN SODIUM 80 MILLIGRAM(S): 100 INJECTION SUBCUTANEOUS at 06:29

## 2017-03-04 RX ADMIN — Medication 12.5 MILLIGRAM(S): at 00:08

## 2017-03-04 RX ADMIN — Medication 100 MILLIGRAM(S): at 06:29

## 2017-03-04 RX ADMIN — Medication 100 MILLIGRAM(S): at 16:11

## 2017-03-04 NOTE — DISCHARGE NOTE ADULT - MEDICATION SUMMARY - MEDICATIONS TO STOP TAKING
I will STOP taking the medications listed below when I get home from the hospital:    amLODIPine-benazepril 10 mg-40 mg oral capsule  -- 1 cap(s) by mouth once a day

## 2017-03-04 NOTE — DISCHARGE NOTE ADULT - CARE PROVIDER_API CALL
Jarrett Jose (MD), Surgery; Thoracic and Cardiac Surgery  [address] [place] [address]  Phone: [phone]  Fax: [phone]

## 2017-03-04 NOTE — DISCHARGE NOTE ADULT - PATIENT PORTAL LINK FT
“You can access the [facility] Patient Portal, offered by [facility], by registering with the following website: [email or web address]”

## 2017-03-04 NOTE — PROGRESS NOTE ADULT - SUBJECTIVE AND OBJECTIVE BOX
Patient discussed on morning rounds with Dr. Bender     Operation / Date: 2/24/17 Mini AVR and DCCV     Surgeon: Dr. Jose     Referring Physician: Juan Diego Cabrales     SUBJECTIVE ASSESSMENT:  72y Male reports feeling well today. He offers no complaints. Denies Chest pain, SOB, palpitations, dizziness, n/v/d, fever or chills.     Hospital Course:    72 year old, Creole speaking, with a past medical history of hypertension and hyperlipidemia presents for evaluation and management of valvular disease. Echocardiogram 6/14/16 revealed: EF 65-70%. LVDD 5.27cm. Severe aortic stenosis. Severe aortic regurgitation. Moderate mitral regurgitation. Cardiac catheterization 2/15/17 revealed normal coronaries. Repeat echocardiogram revealed: the left ventricular ejection fraction is estimated to be 65-70%. There is moderate to severe aortic regurgitation. There is severe aortic stenosis. The mean pressure gradient is 52 mmHg. The calculated aortic valve area using the continuity equation is 0.8 cm2. Structurally normal mitral valve. There is trace mitral regurgitation.  The patient was evaluated by CT surgery and is deemed a surgical candidate for aortic valve replacement. On 2/24/17 patient underwent a Mini AVR with Dr. Jose. There were no complication intraoperatively but once patient arrived to the ICU he had intermittent bradycardia that required pacing. Later in the day he was able to be extubated. POD#3 patient went into afib and was started on amiodarone and lopressor. POD4-5 he remained in afib so EPS was consulted. POD#5 patient underwent DCCV and converted to Normal Sinus Rhythm. POD#5 patient was stable for transfer to the floor. He remained in NSR. POD#6 patient's right pleural emiliano had high output so it was kept in. He was also started on Coumadin. POD#7 patient was started on Lovenox for a bridge. POD#8 patient still had high output from emiliano in the AM. By the end of the day output slowed. Pacing wires were cut due to resistance. POD #9 emiliano was removed without evidence of pnuemothorax. As per Dr. Bender and Susan patient was stable for discharge home. Instructions for discharge gone over at length and all questions were answered  Vital Signs Last 24 Hrs  T(C): 36.3, Max: 36.8 (03-04 @ 01:00)  T(F): 97.4, Max: 98.2 (03-04 @ 01:00)  HR: 58 (51 - 63)  BP: 104/54 (104/54 - 135/87)  BP(mean): 66 (66 - 96)  RR: 16 (16 - 18)  SpO2: 96% (95% - 98%)  I&O's Detail  I & Os for 24h ending 04 Mar 2017 07:00  =============================================  IN:    Oral Fluid: 220 ml    Total IN: 220 ml  ---------------------------------------------  OUT:    Voided: 825 ml    Chest Tube: 425 ml    Total OUT: 1250 ml  ---------------------------------------------  Total NET: -1030 ml    I & Os for current day (as of 04 Mar 2017 14:38)  =============================================  IN:    Total IN: 0 ml  ---------------------------------------------  OUT:    Chest Tube: 80 ml    Total OUT: 80 ml  ---------------------------------------------  Total NET: -80 ml      EPICARDIAL WIRES REMOVED: Yes  TIE DOWNS REMOVED: Yes    PHYSICAL EXAM:    General: Aox3 in no acute distress    Neurological: No focal neuro deficit     Cardiovascular: RRR no murmurs rubs or gallops.     Respiratory: CTA bilaterally. No wheeze rhonchi or rales     Gastrointestinal: soft non tender non distended.     Extremities: WWP no peripheral edema.     Vascular: 2+ distal pulses bilaterally.     Incision Sites: Mini- sternontomy CDI with no sternal click.   LABS:                        8.4    5.4   )-----------( 293      ( 04 Mar 2017 10:48 )             26.8       COUMADIN:  Yes        DOSE:5mg            INDICATION:  afib              GOAL INR: 2-3    PT/INR - ( 04 Mar 2017 10:48 )   PT: 16.1 sec;   INR: 1.45          PTT - ( 04 Mar 2017 10:48 )  PTT:35.2 sec    04 Mar 2017 10:48    139    |  105    |  10     ----------------------------<  145    4.2     |  30     |  0.81     Ca    8.8        04 Mar 2017 10:48  Phos  3.1       03 Mar 2017 06:30  Mg     2.0       03 Mar 2017 06:30    TPro  6.7    /  Alb  3.3    /  TBili  0.9    /  DBili  x      /  AST  71     /  ALT  114    /  AlkPhos  82     03 Mar 2017 06:30          MEDICATIONS  (STANDING):  famotidine    Tablet 20milliGRAM(s) Oral two times a day  aspirin enteric coated 81milliGRAM(s) Oral daily  dorzolamide 2%/timolol 0.5% Ophthalmic Solution 1Drop(s) Both EYES two times a day  brimonidine 0.2% Ophthalmic Solution 1Drop(s) Both EYES three times a day  latanoprost 0.005% Ophthalmic Solution 1Drop(s) Both EYES at bedtime  metoprolol 12.5milliGRAM(s) Oral every 6 hours  docusate sodium 100milliGRAM(s) Oral three times a day  senna 2Tablet(s) Oral at bedtime  amiodarone    Tablet 200milliGRAM(s) Oral daily  sodium chloride 0.9% lock flush 3milliLiter(s) IV Push every 8 hours  enoxaparin Injectable 80milliGRAM(s) SubCutaneous two times a day      Discharge CXR: Portable examination the chest demonstrates cardiomegaly. Status post   sternotomy and valvular replacement. Left effusion. Underlying   infiltrates cannot be excluded.    Impression: Left effusion. Underlying infiltrates cannot be excluded    Discharge ECHO: Interpretation Summary  There is moderate concentric left ventricular hypertrophy.Abnormal   (paradoxical) septal motion consistent with post-operative statusThe left   ventricular ejection fraction is estimated to be 45-50%The left atrial   size is   normal.The mitral inflow pattern is consistent with pseudo-normalization,   suggestive of moderately elevated left atrial pressure (15-20mmHg).    Right   atrial size is normal.The right ventricle is normal in size and   function.There   is a bioprosthetic aortic valve.The peak pressure gradient is 32   mmHg.The mean   pressure gradient is 19 mmHg.Structurally normal mitral valve.There is   mild   mitral regurgitation.Structurally normal tricuspid valve.There is mild   tricuspid regurgitation.There is no echocardiographic evidence for   pulmonary   hypertension.No aortic rootdilatation.There is no pericardial effusion.

## 2017-03-04 NOTE — DISCHARGE NOTE ADULT - CARE PLAN
Goal:	Recover from Surgery  Instructions for follow-up, activity and diet:	-Please follow up with Dr. Jose. The office will contact you to set up an appointment  The office is located at [facility], [facility], 4th floor. Call us with any questions #[phone].    You will also see your PCP Dr. Jorge Javed on Monday 3/6 at 12:30 for INR check.     -Walk daily as tolerated and use your incentive spirometer every hour.    -No driving or strenuous activity/exercise for 6 weeks, or until cleared by your surgeon.    -Gently clean your incisions with anti-bacterial soap and water, pat dry.  You may leave them open to air.    -Call your doctor if you have shortness of breath, chest pain not relieved by pain medication, dizziness, fever >101.5, or increased redness or drainage from incisions. Principal Discharge DX:	Aortic stenosis  Goal:	Recover from Surgery  Instructions for follow-up, activity and diet:	-Please follow up with Dr. Jose. The office will contact you to set up an appointment  The office is located at [facility], [facility], 4th floor. Call us with any questions #[phone].    You will also see your PCP Dr. Jorge Javed on Monday 3/6 at 12:30 for INR check.     -Walk daily as tolerated and use your incentive spirometer every hour.    -No driving or strenuous activity/exercise for 6 weeks, or until cleared by your surgeon.    -Gently clean your incisions with anti-bacterial soap and water, pat dry.  You may leave them open to air.    -Call your doctor if you have shortness of breath, chest pain not relieved by pain medication, dizziness, fever >101.5, or increased redness or drainage from incisions.

## 2017-03-04 NOTE — DISCHARGE NOTE ADULT - MEDICATION SUMMARY - MEDICATIONS TO TAKE
I will START or STAY ON the medications listed below when I get home from the hospital:    brimonidine tartrate  -- 0.2 % one drop to each eye TID  -- Indication: For eye medication    aspirin 81 mg oral delayed release tablet  -- 1 tab(s) by mouth once a day  -- Indication: For Heart medicatin     acetaminophen 325 mg oral tablet  -- 2 tab(s) by mouth every 6 hours, As needed, mild pain  -- Indication: For pain medication mild     acetaminophen-oxyCODONE 325 mg-5 mg oral tablet  -- 1 tab(s) by mouth every 4 hours, As needed, Moderate Pain (4 - 6) MDD:6  -- Indication: For moderate to severe pain medication     amiodarone 200 mg oral tablet  -- 1 tab(s) by mouth once a day  -- Indication: For Heart rhythm control    enoxaparin 80 mg/0.8 mL injectable solution  -- 80 milligram(s) injectable 2 times a day  -- It is very important that you take or use this exactly as directed.  Do not skip doses or discontinue unless directed by your doctor.    -- Indication: For blood thinner    Coumadin 5 mg oral tablet  -- 1 tab(s) by mouth once a day  -- Do not take this drug if you are pregnant.  It is very important that you take or use this exactly as directed.  Do not skip doses or discontinue unless directed by your doctor.  Obtain medical advice before taking any non-prescription drugs as some may affect the action of this medication.    -- Indication: For blood thinner     metoprolol tartrate 25 mg oral tablet  -- 0.5 tab(s) by mouth 2 times a day  -- It is very important that you take or use this exactly as directed.  Do not skip doses or discontinue unless directed by your doctor.  May cause drowsiness.  Alcohol may intensify this effect.  Use care when operating dangerous machinery.  Some non-prescription drugs may aggravate your condition.  Read all labels carefully.  If a warning appears, check with your doctor before taking.  Take with food or milk.  This drug may impair the ability to drive or operate machinery.  Use care until you become familiar with its effects.    -- Indication: For blood pressure control    docusate sodium 100 mg oral capsule  -- 1 cap(s) by mouth 3 times a day MDD:as needed for constipation  -- Indication: For Constipation as needed     polyethylene glycol 3350 oral powder for reconstitution  -- 17 gram(s) by mouth once a day, As needed, Constipation MDD:as needed for constipation.  -- Indication: For Constipation as needed     senna oral tablet  -- 2 tab(s) by mouth once a day (at bedtime) MDD:as needed for constipation  -- Indication: For Constipation as needed     dorzolamide-timolol 2.23%-0.68% ophthalmic solution  -- 1 drop(s) to each affected eye 2 times a day  -- Indication: For eye drops     latanoprost 0.005% ophthalmic solution  -- 1 drop(s) to each affected eye once a day (in the evening)  -- Indication: For eye drops

## 2017-03-04 NOTE — DISCHARGE NOTE ADULT - PLAN OF CARE
Recover from Surgery -Please follow up with Dr. Jose. The office will contact you to set up an appointment  The office is located at [facility], [facility], 4th floor. Call us with any questions #[phone].    You will also see your PCP Dr. Jorge Javed on Monday 3/6 at 12:30 for INR check.     -Walk daily as tolerated and use your incentive spirometer every hour.    -No driving or strenuous activity/exercise for 6 weeks, or until cleared by your surgeon.    -Gently clean your incisions with anti-bacterial soap and water, pat dry.  You may leave them open to air.    -Call your doctor if you have shortness of breath, chest pain not relieved by pain medication, dizziness, fever >101.5, or increased redness or drainage from incisions.

## 2017-03-04 NOTE — DISCHARGE NOTE ADULT - CARE PROVIDERS DIRECT ADDRESSES
[email or web address]

## 2017-03-04 NOTE — PROGRESS NOTE ADULT - PROVIDER SPECIALTY LIST ADULT
CT Surgery
Critical Care

## 2017-03-06 DIAGNOSIS — H40.9 UNSPECIFIED GLAUCOMA: ICD-10-CM

## 2017-03-06 DIAGNOSIS — I48.91 UNSPECIFIED ATRIAL FIBRILLATION: ICD-10-CM

## 2017-03-06 DIAGNOSIS — Z91.018 ALLERGY TO OTHER FOODS: ICD-10-CM

## 2017-03-06 DIAGNOSIS — E66.9 OBESITY, UNSPECIFIED: ICD-10-CM

## 2017-03-06 DIAGNOSIS — R00.1 BRADYCARDIA, UNSPECIFIED: ICD-10-CM

## 2017-03-06 DIAGNOSIS — E78.5 HYPERLIPIDEMIA, UNSPECIFIED: ICD-10-CM

## 2017-03-06 DIAGNOSIS — Z83.3 FAMILY HISTORY OF DIABETES MELLITUS: ICD-10-CM

## 2017-03-06 DIAGNOSIS — I48.92 UNSPECIFIED ATRIAL FLUTTER: ICD-10-CM
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
## 2017-03-06 DIAGNOSIS — J45.909 UNSPECIFIED ASTHMA, UNCOMPLICATED: ICD-10-CM

## 2017-03-06 DIAGNOSIS — I08.0 RHEUMATIC DISORDERS OF BOTH MITRAL AND AORTIC VALVES: ICD-10-CM

## 2017-03-06 DIAGNOSIS — I50.9 HEART FAILURE, UNSPECIFIED: ICD-10-CM

## 2017-03-06 DIAGNOSIS — I11.0 HYPERTENSIVE HEART DISEASE WITH HEART FAILURE: ICD-10-CM

## 2017-03-06 DIAGNOSIS — I35.2 NONRHEUMATIC AORTIC (VALVE) STENOSIS WITH INSUFFICIENCY: ICD-10-CM

## 2017-03-06 DIAGNOSIS — I97.89 OTHER POSTPROCEDURAL COMPLICATIONS AND DISORDERS OF THE CIRCULATORY SYSTEM, NOT ELSEWHERE CLASSIFIED: ICD-10-CM

## 2017-03-06 DIAGNOSIS — D69.6 THROMBOCYTOPENIA, UNSPECIFIED: ICD-10-CM

## 2017-03-06 PROBLEM — I35.9 AORTIC VALVE DISEASE: Status: ACTIVE | Noted: 2017-03-06

## 2017-03-07 PROBLEM — I50.9 HEART FAILURE, UNSPECIFIED: Chronic | Status: ACTIVE | Noted: 2017-02-21

## 2017-03-07 PROBLEM — Z95.3 S/P AORTIC VALVE REPLACEMENT WITH TISSUE: Status: ACTIVE | Noted: 2017-03-06

## 2017-03-07 PROBLEM — I10 ESSENTIAL (PRIMARY) HYPERTENSION: Chronic | Status: ACTIVE | Noted: 2017-02-13

## 2017-03-07 PROBLEM — E78.00 PURE HYPERCHOLESTEROLEMIA, UNSPECIFIED: Chronic | Status: ACTIVE | Noted: 2017-02-21

## 2017-03-07 PROBLEM — I35.1 NONRHEUMATIC AORTIC (VALVE) INSUFFICIENCY: Chronic | Status: ACTIVE | Noted: 2017-02-21

## 2017-03-07 PROBLEM — I97.89 POSTOPERATIVE ATRIAL FIBRILLATION: Status: ACTIVE | Noted: 2017-03-07

## 2017-03-07 PROBLEM — H40.9 UNSPECIFIED GLAUCOMA: Chronic | Status: ACTIVE | Noted: 2017-02-21

## 2017-03-07 PROBLEM — Z09 POSTOP CHECK: Status: ACTIVE | Noted: 2017-03-07

## 2017-03-07 PROBLEM — I35.0 NONRHEUMATIC AORTIC (VALVE) STENOSIS: Chronic | Status: ACTIVE | Noted: 2017-02-21

## 2017-03-07 RX ORDER — ACETAMINOPHEN 325 MG/1
325 TABLET, FILM COATED ORAL
Qty: 240 | Refills: 0 | Status: ACTIVE | COMMUNITY
Start: 2017-03-07

## 2017-03-07 RX ORDER — ENOXAPARIN SODIUM 80 MG/.8ML
80 INJECTION SUBCUTANEOUS
Refills: 0 | Status: ACTIVE | COMMUNITY
Start: 2017-03-07

## 2017-03-07 RX ORDER — AMIODARONE HYDROCHLORIDE 200 MG/1
200 TABLET ORAL
Qty: 30 | Refills: 0 | Status: ACTIVE | COMMUNITY
Start: 2017-03-07

## 2017-03-07 RX ORDER — OXYCODONE HYDROCHLORIDE AND ACETAMINOPHEN 5; 325 MG/1; MG/1
5-325 TABLET ORAL
Refills: 0 | Status: ACTIVE | COMMUNITY
Start: 2017-03-07

## 2017-03-07 RX ORDER — METOPROLOL TARTRATE 25 MG/1
25 TABLET, FILM COATED ORAL
Qty: 30 | Refills: 3 | Status: ACTIVE | COMMUNITY
Start: 2017-03-07

## 2017-03-07 RX ORDER — SENNOSIDES 8.6 MG/1
8.6 TABLET ORAL
Refills: 0 | Status: ACTIVE | COMMUNITY
Start: 2017-03-07

## 2017-03-07 RX ORDER — DOCUSATE SODIUM 100 MG/1
100 CAPSULE ORAL
Refills: 0 | Status: ACTIVE | COMMUNITY
Start: 2017-03-07

## 2017-03-07 RX ORDER — WARFARIN SODIUM 5 MG/1
5 TABLET ORAL DAILY
Qty: 15 | Refills: 0 | Status: ACTIVE | COMMUNITY
Start: 2017-03-07

## 2017-03-08 ENCOUNTER — OUTPATIENT (OUTPATIENT)
Dept: OUTPATIENT SERVICES | Facility: HOSPITAL | Age: 72
LOS: 1 days | End: 2017-03-08
Payer: COMMERCIAL

## 2017-03-08 ENCOUNTER — APPOINTMENT (OUTPATIENT)
Dept: CARDIOTHORACIC SURGERY | Facility: CLINIC | Age: 72
End: 2017-03-08

## 2017-03-08 VITALS
OXYGEN SATURATION: 97 % | BODY MASS INDEX: 32.78 KG/M2 | SYSTOLIC BLOOD PRESSURE: 153 MMHG | WEIGHT: 197 LBS | RESPIRATION RATE: 17 BRPM | HEART RATE: 60 BPM | DIASTOLIC BLOOD PRESSURE: 77 MMHG | TEMPERATURE: 97.2 F

## 2017-03-08 DIAGNOSIS — Z41.9 ENCOUNTER FOR PROCEDURE FOR PURPOSES OTHER THAN REMEDYING HEALTH STATE, UNSPECIFIED: Chronic | ICD-10-CM

## 2017-03-08 DIAGNOSIS — Z95.3 PRESENCE OF XENOGENIC HEART VALVE: ICD-10-CM

## 2017-03-08 DIAGNOSIS — I97.89 OTHER POSTPROCEDURAL COMPLICATIONS AND DISORDERS OF THE CIRCULATORY SYSTEM, NOT ELSEWHERE CLASSIFIED: ICD-10-CM

## 2017-03-08 DIAGNOSIS — Z79.01 LONG TERM (CURRENT) USE OF ANTICOAGULANTS: ICD-10-CM

## 2017-03-08 DIAGNOSIS — I48.91 OTHER POSTPROCEDURAL COMPLICATIONS AND DISORDERS OF THE CIRCULATORY SYSTEM, NOT ELSEWHERE CLASSIFIED: ICD-10-CM

## 2017-03-08 DIAGNOSIS — Z98.890 OTHER SPECIFIED POSTPROCEDURAL STATES: Chronic | ICD-10-CM

## 2017-03-08 DIAGNOSIS — Z09 ENCOUNTER FOR FOLLOW-UP EXAMINATION AFTER COMPLETED TREATMENT FOR CONDITIONS OTHER THAN MALIGNANT NEOPLASM: ICD-10-CM

## 2017-03-08 DIAGNOSIS — Z95.1 PRESENCE OF AORTOCORONARY BYPASS GRAFT: ICD-10-CM

## 2017-03-08 DIAGNOSIS — G47.00 INSOMNIA, UNSPECIFIED: ICD-10-CM

## 2017-03-08 LAB
ALBUMIN SERPL ELPH-MCNC: 3.4 G/DL — SIGNIFICANT CHANGE UP (ref 3.4–5)
ALP SERPL-CCNC: 103 U/L — SIGNIFICANT CHANGE UP (ref 40–120)
ALT FLD-CCNC: 77 U/L — HIGH (ref 12–42)
ANION GAP SERPL CALC-SCNC: 4 MMOL/L — LOW (ref 9–16)
APTT BLD: 36.1 SEC — SIGNIFICANT CHANGE UP (ref 27.5–37.4)
AST SERPL-CCNC: 54 U/L — HIGH (ref 15–37)
BASOPHILS NFR BLD AUTO: 0.3 % — SIGNIFICANT CHANGE UP (ref 0–2)
BILIRUB SERPL-MCNC: 0.4 MG/DL — SIGNIFICANT CHANGE UP (ref 0.2–1.2)
BUN SERPL-MCNC: 8 MG/DL — SIGNIFICANT CHANGE UP (ref 7–23)
CALCIUM SERPL-MCNC: 8.6 MG/DL — SIGNIFICANT CHANGE UP (ref 8.5–10.5)
CHLORIDE SERPL-SCNC: 105 MMOL/L — SIGNIFICANT CHANGE UP (ref 96–108)
CO2 SERPL-SCNC: 30 MMOL/L — SIGNIFICANT CHANGE UP (ref 22–31)
CREAT SERPL-MCNC: 0.82 MG/DL — SIGNIFICANT CHANGE UP (ref 0.5–1.3)
EOSINOPHIL NFR BLD AUTO: 2.2 % — SIGNIFICANT CHANGE UP (ref 0–6)
GLUCOSE SERPL-MCNC: 103 MG/DL — HIGH (ref 70–99)
HCT VFR BLD CALC: 28.2 % — LOW (ref 39–50)
HGB BLD-MCNC: 8.9 G/DL — LOW (ref 13–17)
INR BLD: 1.33 — HIGH (ref 0.88–1.16)
LYMPHOCYTES # BLD AUTO: 20.4 % — SIGNIFICANT CHANGE UP (ref 13–44)
MCHC RBC-ENTMCNC: 24.1 PG — LOW (ref 27–34)
MCHC RBC-ENTMCNC: 31.6 G/DL — LOW (ref 32–36)
MCV RBC AUTO: 76.2 FL — LOW (ref 80–100)
MONOCYTES NFR BLD AUTO: 5.9 % — SIGNIFICANT CHANGE UP (ref 2–14)
NEUTROPHILS NFR BLD AUTO: 71.2 % — SIGNIFICANT CHANGE UP (ref 43–77)
PLATELET # BLD AUTO: 318 K/UL — SIGNIFICANT CHANGE UP (ref 150–400)
POTASSIUM SERPL-MCNC: 4.2 MMOL/L — SIGNIFICANT CHANGE UP (ref 3.5–5.3)
POTASSIUM SERPL-SCNC: 4.2 MMOL/L — SIGNIFICANT CHANGE UP (ref 3.5–5.3)
PROT SERPL-MCNC: 7 G/DL — SIGNIFICANT CHANGE UP (ref 6.4–8.2)
PROTHROM AB SERPL-ACNC: 14.8 SEC — HIGH (ref 10–13.1)
RBC # BLD: 3.7 M/UL — LOW (ref 4.2–5.8)
RBC # FLD: 14.8 % — SIGNIFICANT CHANGE UP (ref 10.3–16.9)
SODIUM SERPL-SCNC: 139 MMOL/L — SIGNIFICANT CHANGE UP (ref 135–145)
WBC # BLD: 6 K/UL — SIGNIFICANT CHANGE UP (ref 3.8–10.5)
WBC # FLD AUTO: 6 K/UL — SIGNIFICANT CHANGE UP (ref 3.8–10.5)

## 2017-03-08 PROCEDURE — 80053 COMPREHEN METABOLIC PANEL: CPT

## 2017-03-08 PROCEDURE — 85610 PROTHROMBIN TIME: CPT

## 2017-03-08 PROCEDURE — 85025 COMPLETE CBC W/AUTO DIFF WBC: CPT

## 2017-03-08 PROCEDURE — 85730 THROMBOPLASTIN TIME PARTIAL: CPT

## 2017-03-08 RX ORDER — ZOLPIDEM TARTRATE 5 MG/1
5 TABLET ORAL
Qty: 1 | Refills: 0 | Status: ACTIVE | COMMUNITY
Start: 2017-03-08 | End: 1900-01-01

## 2017-09-05 ENCOUNTER — APPOINTMENT (OUTPATIENT)
Dept: ORTHOPEDIC SURGERY | Facility: CLINIC | Age: 72
End: 2017-09-05

## 2020-05-14 PROBLEM — I35.0 SEVERE AORTIC STENOSIS: Status: ACTIVE | Noted: 2017-02-07

## 2022-01-01 NOTE — H&P ADULT. - ENDOCRINE
Orientation to room/Bed in low position, brakes on/Side rails x 2 or 4 up, assess large gaps, such that a patient could get extremity or other body part entrapped, use additional safety procedures/Call light is within reach, educate patient/family on its functionality/Patient and family education available to parents and patient/Document fall prevention teaching and include in plan of care/Educate patient/parents of falls protocol precautions/Keep bed in the lowest position, unless patient is directly attended negative

## 2022-02-09 NOTE — PATIENT PROFILE ADULT. - BILL OF RIGHTS/ADMISSION INFORMATION PROVIDED TO:
Patient Clindamycin Counseling: I counseled the patient regarding use of clindamycin as an antibiotic for prophylactic and/or therapeutic purposes. Clindamycin is active against numerous classes of bacteria, including skin bacteria. Side effects may include nausea, diarrhea, gastrointestinal upset, rash, hives, yeast infections, and in rare cases, colitis.

## 2022-03-01 NOTE — PHYSICAL THERAPY INITIAL EVALUATION ADULT - GAIT DISTANCE, PT EVAL
Update History & Physical    The patient's History and Physical  was reviewed with the patient and I examined the patient. There was  NO CHANGE:[id number]}. The surgical site was confirmed by the patient and me. Plan: The risks, benefits, expected outcome, and alternative to the recommended procedure have been discussed with the patient. Patient understands and wants to proceed with the procedure.      Electronically signed by Luis Carlos Trevizo MD on 3/1/2022 at 9:13 AM 200 feet

## 2023-12-07 NOTE — PHYSICAL THERAPY INITIAL EVALUATION ADULT - REHAB POTENTIAL, PT EVAL
How Severe Is It?: mild
Is This A New Presentation, Or A Follow-Up?: Follow Up Aging Face
Additional History: Would like TRETINOIN 0.5% refills
good, to achieve stated therapy goals

## 2024-01-16 NOTE — DISCHARGE NOTE ADULT - HOSPITAL COURSE
36.8 72 year old, Creole speaking, with a past medical history of hypertension and hyperlipidemia presents for evaluation and management of valvular disease.     Echocardiogram 6/14/16 revealed: EF 65-70%. LVDD 5.27cm. Severe aortic stenosis. Severe aortic regurgitation. Moderate mitral regurgitation.         Cardiac catheterization 2/15/17 revealed normal coronaries. Repeat echocardiogram revealed: the left ventricular ejection fraction is estimated to be 65-70%. There is moderate to severe aortic regurgitation. There is severe aortic stenosis. The mean pressure gradient is 52 mmHg. The calculated aortic valve area using the continuity equation is 0.8 cm2. Structurally normal mitral valve. There is trace mitral regurgitation.    The patient was evaluated by CT surgery and is deemed a surgical candidate for aortic valve replacement. The patient agrees to proceed with surgery. 72 year old, Creole speaking, with a past medical history of hypertension and hyperlipidemia presents for evaluation and management of valvular disease. Echocardiogram 6/14/16 revealed: EF 65-70%. LVDD 5.27cm. Severe aortic stenosis. Severe aortic regurgitation. Moderate mitral regurgitation. Cardiac catheterization 2/15/17 revealed normal coronaries. Repeat echocardiogram revealed: the left ventricular ejection fraction is estimated to be 65-70%. There is moderate to severe aortic regurgitation. There is severe aortic stenosis. The mean pressure gradient is 52 mmHg. The calculated aortic valve area using the continuity equation is 0.8 cm2. Structurally normal mitral valve. There is trace mitral regurgitation.  The patient was evaluated by CT surgery and is deemed a surgical candidate for aortic valve replacement. On 2/24/17 patient underwent a Mini AVR with Dr. Jose. There were no complication intraoperatively but once patient arrived to the ICU he had intermittent bradycardia that required pacing. Later in the day he was able to be extubated. POD#3 patient went into afib and was started on amiodarone and lopressor. POD4-5 he remained in afib so EPS was consulted. POD#5 patient underwent DCCV and converted to Normal Sinus Rhythm. POD#5 patient was stable for transfer to the floor. He remained in NSR. POD#6 patient's right pleural emiliano had high output so it was kept in. He was also started on Coumadin. POD#7 patient was started on Lovenox for a bridge. POD#8 patient still had high output from emiliano in the AM. By the end of the day output slowed. Pacing wires were cut due to resistance. POD #9 emiliano was removed without evidence of pnuemothorax. As per Dr. Washburn patient was stable for discharge home. Instructions for discharge gone over at length and all questions were answered.